# Patient Record
Sex: FEMALE | Race: WHITE | NOT HISPANIC OR LATINO | Employment: OTHER | URBAN - METROPOLITAN AREA
[De-identification: names, ages, dates, MRNs, and addresses within clinical notes are randomized per-mention and may not be internally consistent; named-entity substitution may affect disease eponyms.]

---

## 2017-02-24 ENCOUNTER — HOSPITAL ENCOUNTER (OUTPATIENT)
Dept: MAMMOGRAPHY | Facility: CLINIC | Age: 47
Discharge: HOME | End: 2017-02-24
Attending: FAMILY MEDICINE | Admitting: FAMILY MEDICINE
Payer: COMMERCIAL

## 2017-02-24 DIAGNOSIS — Z12.31 VISIT FOR SCREENING MAMMOGRAM: ICD-10-CM

## 2017-02-24 PROCEDURE — G0202 SCR MAMMO BI INCL CAD: HCPCS

## 2018-04-04 ENCOUNTER — OFFICE VISIT (OUTPATIENT)
Dept: OBGYN | Facility: CLINIC | Age: 48
End: 2018-04-04
Payer: COMMERCIAL

## 2018-04-04 VITALS
BODY MASS INDEX: 21.82 KG/M2 | DIASTOLIC BLOOD PRESSURE: 62 MMHG | HEIGHT: 68 IN | WEIGHT: 144 LBS | SYSTOLIC BLOOD PRESSURE: 112 MMHG

## 2018-04-04 DIAGNOSIS — N84.1 CERVICAL POLYP: ICD-10-CM

## 2018-04-04 DIAGNOSIS — Z01.419 ENCOUNTER FOR GYNECOLOGICAL EXAMINATION WITHOUT ABNORMAL FINDING: Primary | ICD-10-CM

## 2018-04-04 PROCEDURE — 87624 HPV HI-RISK TYP POOLED RSLT: CPT | Performed by: OBSTETRICS & GYNECOLOGY

## 2018-04-04 PROCEDURE — 88305 TISSUE EXAM BY PATHOLOGIST: CPT | Performed by: OBSTETRICS & GYNECOLOGY

## 2018-04-04 PROCEDURE — G0145 SCR C/V CYTO,THINLAYER,RESCR: HCPCS | Performed by: OBSTETRICS & GYNECOLOGY

## 2018-04-04 PROCEDURE — 99396 PREV VISIT EST AGE 40-64: CPT | Mod: 25 | Performed by: OBSTETRICS & GYNECOLOGY

## 2018-04-04 PROCEDURE — 57500 BIOPSY OF CERVIX: CPT | Performed by: OBSTETRICS & GYNECOLOGY

## 2018-04-04 NOTE — NURSING NOTE
"Chief Complaint   Patient presents with     Physical       Initial /62  Ht 5' 8\" (1.727 m)  Wt 144 lb (65.3 kg)  LMP 03/20/2018 (Approximate)  Breastfeeding? No  BMI 21.9 kg/m2 Estimated body mass index is 21.9 kg/(m^2) as calculated from the following:    Height as of this encounter: 5' 8\" (1.727 m).    Weight as of this encounter: 144 lb (65.3 kg).  Medication Reconciliation: complete     Gloria Mueller CMA      "

## 2018-04-04 NOTE — LETTER
April 11, 2018    Elinor Hammond  [address]  [place] [address]    Dear Elinor,  We are happy to inform you that your PAP smear result from 4/4/18 is normal.  We are now able to do a follow up test on PAP smears. The DNA test is for HPV (Human Papilloma Virus). Cervical cancer is closely linked with certain types of HPV. Your results showed no evidence of high risk HPV.  Therefore we recommend you return in 3 years for your next pap smear.  You will still need to return to the clinic every year for an annual exam and other preventive tests.  Please contact the clinic at [phone] with any questions.  Sincerely,    aPttie Hernandez MD/radha

## 2018-04-04 NOTE — PROGRESS NOTES
HPI: Elinor Hammond is a 47 year old female   Obstetric History       T1      L3     SAB0   TAB0   Ectopic0   Multiple1   Live Births0    Obstetric Comments   's    No LMP recorded. who presents for annual health maintenance.    Patient reports that she had no menses ×5-6 months, and then regular menses in February and March.  She states that her bleeding prior to this episode has been light, spotting.  She denies any episodes of prolonged or heavy periods.    Employment:homemaker, former teacher     Exercise/Eating:trying to eat health; she goes to the gym, jogs/walks approximately 3 times a week, 45-60 minutes each time    PGYNH: menarche age 9; + history of abnormal paps: one in early 20s, colpo/cryo, normal paps since, denies history of STDs    Past Medical History:   Diagnosis Date     NO ACTIVE PROBLEMS      Past Surgical History:   Procedure Laterality Date     HC RECONSTR NOSE+EMILY SEPTAL REPAIR       HC TRACHELORRHAPHY, PLASTIC REPAIR UTERINE CERVIX, VAG APPROACH      cerclage/      Family History   Problem Relation Age of Onset     CANCER Paternal Grandmother      lung     CANCER Paternal Grandfather      ?     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     Social History Main Topics     Smoking status: Never Smoker     Smokeless tobacco: Never Used     Alcohol use 0.5 oz/week     1 drink(s) per week      Comment: occ     Drug use: No     Sexual activity: Yes     Partners: Male     Birth control/ protection: Surgical      Comment: vas     Other Topics Concern      Service No     Blood Transfusions No     Caffeine Concern Yes     2 cups coffee     Weight Concern No     Special Diet No      Exercise Yes     cardio/classes     Seat Belt Yes     Self-Exams Yes     Social History Narrative       Review of Systems:   CONSTITUTIONAL:NEGATIVE for fever, chills, change in weight  INTEGUMENTARY/SKIN: NEGATIVE for worrisome rashes, moles or lesions  RESP:NEGATIVE for significant cough or SOB  BREAST: NEGATIVE for masses, tenderness or discharge  CV: NEGATIVE for chest pain, palpitations or peripheral edema  GI: NEGATIVE for abdominal pain, heartburn, or change in bowel habits, nausea, emesis  : negative for, dysuria, vaginal discharge and bleeding  MUSCULOSKELETAL: NEGATIVE for significant arthralgias or myalgia  NEURO: NEGATIVE for weakness, dizziness or paresthesias  PSYCHIATRIC: NEGATIVE for changes in mood or affect       Physical exam:  GENERAL APPEARANCE: healthy, alert and no distress  NECK: no adenopathy, no asymmetry, masses, or scars and thyroid normal to palpation  RESP: lungs clear to auscultation - no rales, rhonchi or wheezes  BREAST: normal without masses, tenderness or nipple discharge and no palpable axillary masses or adenopathy  CV: regular rates and rhythm, normal S1 S2, no S3 or S4 and no murmur, click or rub -  ABDOMEN:  soft, nontender, no HSM or masses and bowel sounds normal  PELVIC:   Vulva: normal external female genitalia,   Urethra meatus: normal, non-tender  Vagina: normal vaginal mucosa, rugated, no lesions, normal physiologic discharge.    Cervix: multiparous cervix,  with small approximately 4 mm endocervical polyp at the os.    This was grasped with a long handled clamp and easily removed with some gentle traction/torsion  Uterus: Normal contour, size, position; non-tender  Adnexa: No adnexal masses palpated, non-tender  Perineum: normal, no lesions, intact  Anus: normal, no lesions, hemorrhoids            Assessment/Plan:    (Z01.419) Encounter for gynecological examination without abnormal finding  (primary encounter diagnosis)  Comment:   Plan: Pap imaged thin layer screen  with HPV -         recommended age 30 - 65 years (select HPV order        below), HPV High Risk Types DNA Cervical            (N84.1) Cervical polyp  Comment:   Plan: Surgical pathology exam, BIOPSY/EXCIS CERVICAL         LESION W/WO FULGURATION              PE: reviewed health maintenance including diet, regular exercise and annual exams    This encounter was dictated using voice-recognition software; undetected errors may be present.      Pattie Hernandez M.D.

## 2018-04-04 NOTE — MR AVS SNAPSHOT
"              After Visit Summary   4/4/2018    Elinor Hammond    MRN: [id number]           Patient Information     Date Of Birth          1970        Visit Information        Provider Department      4/4/2018 9:30 AM Pattie Hernandez MD [facility]        Today's Diagnoses     Encounter for gynecological examination without abnormal finding    -  1    Cervical polyp           Follow-ups after your visit        Your next 10 appointments already scheduled     Apr 11, 2018 10:15 AM CDT   Physical-Complete with Gricelda Henriquez MD   [facility] ([facility])    [address]  [address]  [facility] [address] [phone]           Instruct patient that they should have nothing(except water) after midnight the evening prior to the appointment.              Who to contact     If you have questions or need follow up information about today's clinic visit or your schedule please contact [facility] directly at [phone].  Normal or non-critical lab and imaging results will be communicated to you by TourNativehart, letter or phone within 4 business days after the clinic has received the results. If you do not hear from us within 7 days, please contact the clinic through TourNativehart or phone. If you have a critical or abnormal lab result, we will notify you by phone as soon as possible.  Submit refill requests through [employer] or call your pharmacy and they will forward the refill request to us. Please allow 3 business days for your refill to be completed.          Additional Information About Your Visit        TourNativehart Information     [employer] lets you send messages to your doctor, view your test results, renew your prescriptions, schedule appointments and more. To sign up, go to [email or web address] . Click on \"Log in\" on the left side of the screen, which will take you to the Welcome page. Then click on \"Sign up Now\" on the right side " "of the page.     You will be asked to enter the access code listed below, as well as some personal information. Please follow the directions to create your username and password.     Your access code is: [email or web address]  Expires: 7/3/2018 11:01 AM     Your access code will  in 90 days. If you need help or a new code, please call your [place] clinic or [phone].        Care EveryWhere ID     This is your Care EveryWhere ID. This could be used by other organizations to access your [place] medical records  [id number]        Your Vitals Were     Height Last Period Breastfeeding? BMI (Body Mass Index)          5' 8\" (1.727 m) 2018 (Approximate) No 21.9 kg/m2         Blood Pressure from Last 3 Encounters:   18 112/62   04/06/15 104/66   03/05/15 120/80    Weight from Last 3 Encounters:   18 144 lb (65.3 kg)   04/06/15 138 lb 14.4 oz (63 kg)   03/23/15 140 lb (63.5 kg)              We Performed the Following     BIOPSY/EXCIS CERVICAL LESION W/WO FULGURATION     HPV High Risk Types DNA Cervical     Pap imaged thin layer screen with HPV - recommended age 30 - 65 years (select HPV order below)     Surgical pathology exam        Primary Care Provider Office Phone # Fax #    Gricelda Henriquez [phone]       [address] [address]        Equal Access to Services     [facility]: Hadii tenzin grimaldo hadasho Sochantaleali, waaxda luqadaha, qaybta kaalmada ambaryaliliane, reji matson . So [facility] [phone].    ATENCIÓN: Si habla español, tiene a chandler disposición servicios gratuitos de asistencia lingüística. Llame al [phone].    We comply with applicable federal civil rights laws and [place] laws. We do not discriminate on the basis of race, color, national origin, age, disability, sex, sexual orientation, or gender identity.            Thank you!     Thank you for choosing [facility]  for your care. Our goal is always to " provide you with excellent care. Hearing back from our patients is one way we can continue to improve our services. Please take a few minutes to complete the written survey that you may receive in the mail after your visit with us. Thank you!             Your Updated Medication List - Protect others around you: Learn how to safely use, store and throw away your medicines at www.disposemymeds.org.          This list is accurate as of 4/4/18 11:01 AM.  Always use your most recent med list.                   Brand Name Dispense Instructions for use Diagnosis    fish oil-omega-3 fatty acids 1000 MG capsule     180 capsule    Take 2 capsules by mouth daily.    Routine gynecological examination       MULTIVITAMIN TABS   OR           SUMAtriptan 25 MG tablet    IMITREX    18 tablet    Take 1-2 tablets (25-50 mg) by mouth at onset of headache for migraine May repeat dose in 2 hours.  Do not exceed 200 mg in 24 hours    Migraine, unspecified, without mention of intractable migraine without mention of status migrainosus       SYNTHROID 100 MCG tablet   Generic drug:  levothyroxine     90 tablet    Take 1 tablet by mouth daily.    Unspecified hypothyroidism, Goiter

## 2018-04-06 LAB
COPATH REPORT: NORMAL
COPATH REPORT: NORMAL
PAP: NORMAL

## 2018-04-10 LAB
FINAL DIAGNOSIS: NORMAL
HPV HR 12 DNA CVX QL NAA+PROBE: NEGATIVE
HPV16 DNA SPEC QL NAA+PROBE: NEGATIVE
HPV18 DNA SPEC QL NAA+PROBE: NEGATIVE
SPECIMEN DESCRIPTION: NORMAL
SPECIMEN SOURCE CVX/VAG CYTO: NORMAL

## 2018-04-11 ENCOUNTER — OFFICE VISIT (OUTPATIENT)
Dept: FAMILY MEDICINE | Facility: CLINIC | Age: 48
End: 2018-04-11

## 2018-04-11 VITALS
HEART RATE: 73 BPM | DIASTOLIC BLOOD PRESSURE: 66 MMHG | SYSTOLIC BLOOD PRESSURE: 110 MMHG | RESPIRATION RATE: 16 BRPM | WEIGHT: 144.8 LBS | HEIGHT: 68 IN | BODY MASS INDEX: 21.95 KG/M2 | TEMPERATURE: 97.4 F | OXYGEN SATURATION: 99 %

## 2018-04-11 DIAGNOSIS — Z01.419 ENCOUNTER FOR GYNECOLOGICAL EXAMINATION WITHOUT ABNORMAL FINDING: Primary | ICD-10-CM

## 2018-04-11 DIAGNOSIS — Z13.1 SCREENING FOR DIABETES MELLITUS: ICD-10-CM

## 2018-04-11 DIAGNOSIS — E03.8 OTHER SPECIFIED HYPOTHYROIDISM: ICD-10-CM

## 2018-04-11 DIAGNOSIS — Z13.21 ENCOUNTER FOR VITAMIN DEFICIENCY SCREENING: ICD-10-CM

## 2018-04-11 DIAGNOSIS — Z23 NEED FOR VACCINATION: ICD-10-CM

## 2018-04-11 DIAGNOSIS — Z13.220 SCREENING CHOLESTEROL LEVEL: ICD-10-CM

## 2018-04-11 PROBLEM — Z87.42 HX OF ABNORMAL CERVICAL PAP SMEAR: Status: RESOLVED | Noted: 2018-04-11 | Resolved: 2018-04-11

## 2018-04-11 LAB
% GRANULOCYTES: 57.9 %
HBA1C MFR BLD: 5.2 % (ref 4–7)
HCT VFR BLD AUTO: 38.3 % (ref 35–47)
HEMOGLOBIN: 13.2 G/DL (ref 11.7–15.7)
LYMPHOCYTES NFR BLD AUTO: 34 %
MCH RBC QN AUTO: 33.3 PG (ref 26–33)
MCHC RBC AUTO-ENTMCNC: 34.5 G/DL (ref 31–36)
MCV RBC AUTO: 96.6 FL (ref 78–100)
MONOCYTES NFR BLD AUTO: 8.1 %
PLATELET COUNT - QUEST: 189 10^9/L (ref 150–375)
RBC # BLD AUTO: 3.96 10*12/L (ref 3.8–5.2)
WBC # BLD AUTO: 5 10*9/L (ref 4–11)

## 2018-04-11 PROCEDURE — 36415 COLL VENOUS BLD VENIPUNCTURE: CPT | Performed by: FAMILY MEDICINE

## 2018-04-11 PROCEDURE — 85025 COMPLETE CBC W/AUTO DIFF WBC: CPT | Performed by: FAMILY MEDICINE

## 2018-04-11 PROCEDURE — 90471 IMMUNIZATION ADMIN: CPT | Performed by: FAMILY MEDICINE

## 2018-04-11 PROCEDURE — 82306 VITAMIN D 25 HYDROXY: CPT | Mod: 90 | Performed by: FAMILY MEDICINE

## 2018-04-11 PROCEDURE — 83036 HEMOGLOBIN GLYCOSYLATED A1C: CPT | Performed by: FAMILY MEDICINE

## 2018-04-11 PROCEDURE — 80061 LIPID PANEL: CPT | Mod: 90 | Performed by: FAMILY MEDICINE

## 2018-04-11 PROCEDURE — 90715 TDAP VACCINE 7 YRS/> IM: CPT | Performed by: FAMILY MEDICINE

## 2018-04-11 PROCEDURE — 99396 PREV VISIT EST AGE 40-64: CPT | Mod: 25 | Performed by: FAMILY MEDICINE

## 2018-04-11 NOTE — PATIENT INSTRUCTIONS
Await labs    Total calcium intake of 1500 mgm/day, vitamin D 400-800IU/day and a regular weight bearing exercise program for prevention of osteoporosis is recommended treatment at this time.    Sunscreen

## 2018-04-11 NOTE — PROGRESS NOTES
SUBJECTIVE:  Elinor Hammond is an 47 year old G 1 P 2 woman who presents for   A fasting physical exam.. Just saw GYN for a physical exam. Patient's last menstrual period was 2018 (approximate). Periods are irregular, lasting   3 days. Dysmenorrhea:none. Cyclic symptoms   include none. No intermenstrual bleeding,   spotting, or discharge.    Sees Dr Theodore for her thyroid/ still on 100 mcg daily    Current contraception: vasectomy  RAMÓN exposure: no  History of abnormal Pap smear: No  Family history of uterine or ovarian cancer: No  Regular self breast exam: Yes  History of abnormal mammogram: No  Family history of breast cancer: No  History of abnormal lipids: No    Past Medical History:   Diagnosis Date     NO ACTIVE PROBLEMS        Family History   Problem Relation Age of Onset     Neurologic Disorder Mother      migrain     Lipids Mother      DIABETES Mother      Hypertension Mother      CANCER Father      skin     Lipids Father      CANCER Paternal Grandmother      lung     CANCER Paternal Grandfather      ?       Past Surgical History:   Procedure Laterality Date     HC RECONSTR NOSE+EMILY SEPTAL REPAIR       HC TRACHELORRHAPHY, PLASTIC REPAIR UTERINE CERVIX, VAG APPROACH      cerclage/        Current Outpatient Prescriptions   Medication     SUMAtriptan (IMITREX) 25 MG tablet     SYNTHROID 100 MCG tablet     fish oil-omega-3 fatty acids 1000 MG capsule     MULTIVITAMIN TABS   OR     No current facility-administered medications for this visit.      Allergies   Allergen Reactions     Penicillins      rash     Sulfa Drugs      nausea and vomiting       Social History   Substance Use Topics     Smoking status: Never Smoker     Smokeless tobacco: Never Used     Alcohol use 0.5 oz/week     1 Standard drinks or equivalent per week      Comment: occ       Review Of Systems  Ears/Nose/Throat: negative  Respiratory: No shortness of breath, dyspnea on exertion, cough, or hemoptysis  Cardiovascular:  "negative  Gastrointestinal: negative  Genitourinary: negative  Endo: hypothyroid doing well/ last visit 9/2017    OBJECTIVE:  /66 (BP Location: Right arm, Patient Position: Chair, Cuff Size: Adult Regular)  Pulse 73  Temp 97.4  F (36.3  C) (Oral)  Ht 1.727 m (5' 8\")  Wt 65.7 kg (144 lb 12.8 oz)  LMP 03/20/2018 (Approximate)  SpO2 99%  Breastfeeding? No  BMI 22.02 kg/m2  General appearance: healthy, alert, no distress and cooperative  Skin: Skin color, texture, turgor normal. No rashes or lesions.  Ears: negative  Nose/Sinuses: Nares normal. Septum midline. Mucosa normal. No drainage or sinus tenderness.  Oropharynx: Lips, mucosa, and tongue normal. Teeth and gums normal.  Neck: Neck supple. No adenopathy. Thyroid symmetric, normal size,, Carotids without bruits.  Lungs: negative, Percussion normal. Good diaphragmatic excursion. Lungs clear  Heart: negative, PMI normal. No lifts, heaves, or thrills. RRR. No murmurs, clicks gallops or rub  Breasts: deferred  Abdomen: Abdomen soft, non-tender. BS normal. No masses, organomegaly  Pelvic: Deferred  BMI : Body mass index is 22.02 kg/(m^2).    ASSESSMENT:(Z01.419) Encounter for gynecological examination without abnormal finding  (primary encounter diagnosis)  Plan: VENIPUNC FNGR,HEEL,EAR [40508], AUTO         HEMOGRAM/PLATE/DIFF [56117.000]        Total calcium intake of 1500 mgm/day, vitamin D 400-800IU/day and a regular weight bearing exercise program for prevention of osteoporosis is recommended treatment at this time.      (E03.8) Other specified hypothyroidism  Plan: I have reviewed the patient's medical history in detail and updated the computerized patient record.      (Z13.1) Screening for diabetes mellitus  Plan: VENIPUNC FNGR,HEEL,EAR [69492], HEMOGLOBIN A1C            (Z13.220) Screening cholesterol level  Plan: VENIPUNC FNGR,HEEL,EAR [91433], LIPID PANEL            (Z13.21) Encounter for vitamin deficiency screening  Plan: VENIPUNC FNGR,HEEL,EAR " [57502], Vitamin D         deficiency screening (QUEST)            Tetanus given    Dx:  1)  Pap smear  2)  Mammography, lipids at appropriate intervals      PE:  Reviewed health maintenance including diet, regular exercise   and periodic exams.

## 2018-04-11 NOTE — MR AVS SNAPSHOT
After Visit Summary   4/11/2018    Elinor Hammond    MRN: [id number]           Patient Information     Date Of Birth          1970        Visit Information        Provider Department      4/11/2018 10:15 AM Gricelda Henriquez MD [facility]        Today's Diagnoses     Encounter for gynecological examination without abnormal finding    -  1    Other specified hypothyroidism        Screening for diabetes mellitus        Screening cholesterol level        Encounter for vitamin deficiency screening          Care Instructions    Await labs    Total calcium intake of 1500 mgm/day, vitamin D 400-800IU/day and a regular weight bearing exercise program for prevention of osteoporosis is recommended treatment at this time.    Sunscreen          Follow-ups after your visit        Follow-up notes from your care team     Return in about 1 year (around 4/11/2019), or if symptoms worsen or fail to improve.      Who to contact     If you have questions or need follow up information about today's clinic visit or your schedule please contact [facility] directly at [phone].  Normal or non-critical lab and imaging results will be communicated to you by Dnevnikhart, letter or phone within 4 business days after the clinic has received the results. If you do not hear from us within 7 days, please contact the clinic through [employer] or phone. If you have a critical or abnormal lab result, we will notify you by phone as soon as possible.  Submit refill requests through [employer] or call your pharmacy and they will forward the refill request to us. Please allow 3 business days for your refill to be completed.          Additional Information About Your Visit        Dnevnikhart Information     [employer] gives you secure access to your electronic health record. If you see a primary care provider, you can also send messages to your care team and make appointments. If you have questions,  "please call your primary care clinic.  If you do not have a primary care provider, please call [phone] and they will assist you.        Care EveryWhere ID     This is your Care EveryWhere ID. This could be used by other organizations to access your [place] medical records  [id number]        Your Vitals Were     Pulse Temperature Respirations Height Last Period Pulse Oximetry    73 97.4  F (36.3  C) (Oral) 16 1.727 m (5' 8\") 03/20/2018 (Approximate) 99%    Breastfeeding? BMI (Body Mass Index)                No 22.02 kg/m2           Blood Pressure from Last 3 Encounters:   04/11/18 110/66   04/04/18 112/62   04/06/15 104/66    Weight from Last 3 Encounters:   04/11/18 65.7 kg (144 lb 12.8 oz)   04/04/18 65.3 kg (144 lb)   04/06/15 63 kg (138 lb 14.4 oz)              We Performed the Following     AUTO HEMOGRAM/PLATE/DIFF [94532.000]     HEMOGLOBIN A1C     LIPID PANEL     VENIPUNC FNGR,HEEL,EAR [91101]     Vitamin D deficiency screening (QUEST)        Primary Care Provider Office Phone # Fax #    Gricelda Henriquez [phone]       [facility] [address]  [address] [place] [phone]        Equal Access to Services     [facility]: Hadii tenzin ku hadasho Soomaali, waaxda luqadaha, qaybta kaalmada adeegyada, waxay idiin hayaan ambar matson . So [facility] [phone].    ATENCIÓN: Si habla español, tiene a chandler disposición servicios gratuitos de asistencia lingüística. Llame al [phone].    We comply with applicable federal civil rights laws and [place] laws. We do not discriminate on the basis of race, color, national origin, age, disability, sex, sexual orientation, or gender identity.            Thank you!     Thank you for choosing [facility]  for your care. Our goal is always to provide you with excellent care. Hearing back from our patients is one way we can continue to improve our services. Please take a few minutes to complete the written survey that you may " receive in the mail after your visit with us. Thank you!             Your Updated Medication List - Protect others around you: Learn how to safely use, store and throw away your medicines at www.disposemymeds.org.          This list is accurate as of 4/11/18 11:06 AM.  Always use your most recent med list.                   Brand Name Dispense Instructions for use Diagnosis    fish oil-omega-3 fatty acids 1000 MG capsule     180 capsule    Take 2 capsules by mouth daily.    Routine gynecological examination       MULTIVITAMIN TABS   OR           SUMAtriptan 25 MG tablet    IMITREX    18 tablet    Take 1-2 tablets (25-50 mg) by mouth at onset of headache for migraine May repeat dose in 2 hours.  Do not exceed 200 mg in 24 hours    Migraine, unspecified, without mention of intractable migraine without mention of status migrainosus       SYNTHROID 100 MCG tablet   Generic drug:  levothyroxine     90 tablet    Take 1 tablet by mouth daily.    Unspecified hypothyroidism, Goiter

## 2018-04-11 NOTE — NURSING NOTE
Elinor is here for a CPX, pt is fasting and doesn't need a pap, had one last week with OBGYN    Patient is here for a full physical exam.    Pre-Visit Screening :  Immunizations : not up to date - tdap today  Colon Screening : na  Mammogram: is due to be scheduled  Asthma Action Test/Plan : na  PHQ9/GAD7 :  phq2      Vitals:  Pulse - regular  My Chart - accepts    CLASSIFICATION OF OVERWEIGHT AND OBESITY BY BMI                         Obesity Class           BMI(kg/m2)  Underweight                                    < 18.5  Normal                                         18.5-24.9  Overweight                                     25.0-29.9  OBESITY                     I                  30.0-34.9                              II                 35.0-39.9  EXTREME OBESITY             III                >40                             Patient's  BMI There is no height or weight on file to calculate BMI.  http://hin.nhlbi.nih.gov/menuplanner/menu.cgi  Questioned patient about current smoking habits.  Pt. has never smoked.    ETOH screening:  Questions:  1-How often do you have a drink containing alcohol?                             4 times per month(s)  2-How many drinks containing alcohol do you have on a typical day when you are         Drinking?                              1   3- How often do you have 5 or more drinks on one occasion?                              0 per year    Have you ever:  None of the patient's responses to the CAGE screening were positive / Negative CAGE score     The patient has verbalized that it is ok to leave a detailed voice message on the patient's home voicemail with results/recommendations from this visit.       Verified [phone]  phone number:

## 2018-04-12 LAB
CHOLEST SERPL-MCNC: 182 MG/DL
CHOLEST/HDLC SERPL: 4 (CALC)
HDLC SERPL-MCNC: 46 MG/DL
LDLC SERPL CALC-MCNC: 119 MG/DL (CALC)
NONHDLC SERPL-MCNC: 136 MG/DL (CALC)
TRIGL SERPL-MCNC: 71 MG/DL
VITAMIN D, 25-OH, TOTAL - QUEST: 48 NG/ML (ref 30–100)

## 2018-04-13 ENCOUNTER — MYC MEDICAL ADVICE (OUTPATIENT)
Dept: FAMILY MEDICINE | Facility: CLINIC | Age: 48
End: 2018-04-13

## 2018-04-13 NOTE — TELEPHONE ENCOUNTER
From: Elinor Hammond  To: Gricelda Henriquez MD  Sent: 4/13/2018 3:04 PM CDT  Subject: A follow-up question for a visit within the past seven days    Hi Dr. Henriquez,  RE: appt on 4-11-18  Do you known when I can expect to see the fasting glucose test results on [employer]? The cholesterol, vitamin D and hemoglobin results were posted today.   Thank you,  Elinor Hammond

## 2018-05-30 ENCOUNTER — HOSPITAL ENCOUNTER (OUTPATIENT)
Dept: MAMMOGRAPHY | Facility: CLINIC | Age: 48
Discharge: HOME | End: 2018-05-30
Attending: FAMILY MEDICINE | Admitting: FAMILY MEDICINE
Payer: COMMERCIAL

## 2018-05-30 DIAGNOSIS — Z12.31 VISIT FOR SCREENING MAMMOGRAM: ICD-10-CM

## 2018-05-30 PROCEDURE — 77063 BREAST TOMOSYNTHESIS BI: CPT

## 2018-06-04 ENCOUNTER — RADIANT APPOINTMENT (OUTPATIENT)
Dept: GENERAL RADIOLOGY | Facility: CLINIC | Age: 48
End: 2018-06-04
Attending: FAMILY MEDICINE
Payer: COMMERCIAL

## 2018-06-04 ENCOUNTER — OFFICE VISIT (OUTPATIENT)
Dept: ORTHOPEDICS | Facility: CLINIC | Age: 48
End: 2018-06-04
Payer: COMMERCIAL

## 2018-06-04 VITALS
BODY MASS INDEX: 21.82 KG/M2 | DIASTOLIC BLOOD PRESSURE: 62 MMHG | WEIGHT: 144 LBS | SYSTOLIC BLOOD PRESSURE: 102 MMHG | HEIGHT: 68 IN

## 2018-06-04 DIAGNOSIS — M79.671 PAIN OF BOTH HEELS: ICD-10-CM

## 2018-06-04 DIAGNOSIS — M79.672 PAIN OF BOTH HEELS: ICD-10-CM

## 2018-06-04 DIAGNOSIS — M72.2 PLANTAR FASCIITIS, BILATERAL: ICD-10-CM

## 2018-06-04 DIAGNOSIS — M25.579 PAIN IN JOINT INVOLVING ANKLE AND FOOT, UNSPECIFIED LATERALITY: Primary | ICD-10-CM

## 2018-06-04 DIAGNOSIS — M25.579 PAIN IN JOINT INVOLVING ANKLE AND FOOT, UNSPECIFIED LATERALITY: ICD-10-CM

## 2018-06-04 PROCEDURE — 73610 X-RAY EXAM OF ANKLE: CPT | Mod: LT

## 2018-06-04 PROCEDURE — 99203 OFFICE O/P NEW LOW 30 MIN: CPT | Performed by: FAMILY MEDICINE

## 2018-06-04 NOTE — PATIENT INSTRUCTIONS
1. Pain in joint involving ankle and foot, unspecified laterality    2. Pain of both heels    3. Plantar fasciitis, bilateral      Physical therapy: [facility] - [phone]  Discussed exam - tight achilles which contributes to pain over plantar/fascia and medial arch  Reviewed xray - mild midfoot arthritis (right), otherwise no other acute findings  Can consider Turmeric ([employer] - Omega Curcumin) OR Pure Encapsulations (Curcurmin Complex 3). Good anti-inflammatory.    Follow up if not improving after 6 therapy sessions.

## 2018-06-04 NOTE — LETTER
6/4/2018         RE: Elinor Hammond  [address]  [place] [address]        Dear Colleague,    Thank you for referring your patient, Elinor Hammond, to the [facility]. Please see a copy of my visit note below.    ASSESSMENT & PLAN    1. Pain in joint involving ankle and foot, unspecified laterality    2. Pain of both heels    3. Plantar fasciitis, bilateral      Discussed exam - tight achilles which contributes to pain over plantar/fascia and medial arch  Physical therapy: [place] [facility] - [phone]  Reviewed xray - mild midfoot arthritis (right), otherwise no other acute findings  Can consider Turmeric ([employer] Naturals - Omega Curcumin) OR Pure Encapsulations (Curcurmin Complex 3).     Follow up if not improving after 6 therapy sessions.     -----    SUBJECTIVE  Elinor Hammond is a/an 47 year old female who is seen as a self referral for evaluation of bilateral foot pain. The patient is seen by themselves.    Onset: 1 month(s) ago. Reports insidious onset without acute precipitating event.  Location of Pain: bilateral feet, pain goes into ankles/ Achilles,   Rating of Pain at worst: 8/10  Rating of Pain Currently: 8/10  Worsened by: walking, prolonged sitting, driving,   Better with: stretching (short term relief)  Treatments tried: rest/activity avoidance, ice, heat, ibuprofen, Aleve, home exercises and chiropractic care (2 visits), Magnesium, Epson salt soaks, massage  Associated symptoms: swelling, numbness (between 2-3 toes), tingling and crepitus  Orthopedic history: neck (PT no surgery)  Relevant surgical history: NO  Patient Social History: homemaker    Patient's past medical, surgical, social, and family histories were reviewed today and no changes are noted.    Exercise: 2-3 miles 4-5 days/week - OR elliptical.      REVIEW OF SYSTEMS:  10 point ROS is negative other than symptoms noted above in HPI, Past Medical History or as stated below  Constitutional:  "NEGATIVE for fever, chills, change in weight  Skin: NEGATIVE for worrisome rashes, moles or lesions  GI/: NEGATIVE for bowel or bladder changes  Neuro: NEGATIVE for weakness, dizziness or paresthesias    OBJECTIVE:  /62 (BP Location: Right arm, Patient Position: Chair, Cuff Size: Adult Regular)  Ht 5' 8\" (1.727 m)  Wt 144 lb (65.3 kg)  BMI 21.9 kg/m2   General: healthy, alert and in no distress  HEENT: no scleral icterus or conjunctival erythema  Skin: no suspicious lesions or rash. No jaundice.  CV:  no pedal edema  Resp: normal respiratory effort without conversational dyspnea   Psych: normal mood and affect  Gait: normal steady gait with appropriate coordination and balance  Neuro: Normal light sensory exam of lower extremity  MSK:  BILATERAL FOOT  Inspection:    no swelling or ecchymosis  Palpation:    Tender about the plantar fascia origin and medial arch and mildly over Achilles insertion. Otherwise remainder of bony/ligamentous landmarks are non-tender.  Range of Motion:     Grossly intact and non-painful  Strength:    Grossly intact in all planes  Special Tests:    Negative: heel strike, calcaneal squeeze and Lisfranc joint tenderness    BILATERAL ANKLE  Inspection:    No swelling or ecchymosis is observed  Range of Motion:     Plantarflexion full / dorsiflexion full / inversion full / eversion full  Strength:    full  Special Tests:    negative anterior drawer, negative talar tilt, negative valgus stress, negative forced external rotation/eversion, negative Garcias sign, negative squeeze test. Able to perform heel raise and Able to hop.    Independent visualization of the below image:  Recent Results (from the past 24 hour(s))   XR Ankle Bilateral G/E 3 Views    Narrative    ANKLE BILATERAL THREE OR MORE VIEWS  6/4/2018 11:25 AM     HISTORY:  Pain in joint involving ankle and foot, unspecified  laterality.    COMPARISON: None.      Impression    IMPRESSION:   1. Right ankle: No acute or " significant chronic bony or soft tissue  abnormality.  2. Left ankle: No acute or significant chronic bony abnormality.  Minimal lateral soft tissue swelling.     Patient's conditions were thoroughly discussed during today's visit with greater than 50% of the visit spent counseling the patient with total time spent face-to-face with the patient being 20 minutes.    DO [facility]        Again, thank you for allowing me to participate in the care of your patient.        Sincerely,        Niru Mckeon DO

## 2018-06-04 NOTE — PROGRESS NOTES
ASSESSMENT & PLAN    1. Pain in joint involving ankle and foot, unspecified laterality    2. Pain of both heels    3. Plantar fasciitis, bilateral      Discussed exam - tight achilles which contributes to pain over plantar/fascia and medial arch  Physical therapy: [place] [facility] - [phone]  Reviewed xray - mild midfoot arthritis (right), otherwise no other acute findings  Can consider Turmeric (N[employer] Naturals - Omega Curcumin) OR Pure Encapsulations (Curcurmin Complex 3).     Follow up if not improving after 6 therapy sessions.     -----    SUBJECTIVE  Elinor Hammond is a/an 47 year old female who is seen as a self referral for evaluation of bilateral foot pain. The patient is seen by themselves.    Onset: 1 month(s) ago. Reports insidious onset without acute precipitating event.  Location of Pain: bilateral feet, pain goes into ankles/ Achilles,   Rating of Pain at worst: 8/10  Rating of Pain Currently: 8/10  Worsened by: walking, prolonged sitting, driving,   Better with: stretching (short term relief)  Treatments tried: rest/activity avoidance, ice, heat, ibuprofen, Aleve, home exercises and chiropractic care (2 visits), Magnesium, Epson salt soaks, massage  Associated symptoms: swelling, numbness (between 2-3 toes), tingling and crepitus  Orthopedic history: neck (PT no surgery)  Relevant surgical history: NO  Patient Social History: homemaker    Patient's past medical, surgical, social, and family histories were reviewed today and no changes are noted.    Exercise: 2-3 miles 4-5 days/week - OR elliptical.      REVIEW OF SYSTEMS:  10 point ROS is negative other than symptoms noted above in HPI, Past Medical History or as stated below  Constitutional: NEGATIVE for fever, chills, change in weight  Skin: NEGATIVE for worrisome rashes, moles or lesions  GI/: NEGATIVE for bowel or bladder changes  Neuro: NEGATIVE for weakness, dizziness or paresthesias    OBJECTIVE:  /62 (BP Location:  "Right arm, Patient Position: Chair, Cuff Size: Adult Regular)  Ht 5' 8\" (1.727 m)  Wt 144 lb (65.3 kg)  BMI 21.9 kg/m2   General: healthy, alert and in no distress  HEENT: no scleral icterus or conjunctival erythema  Skin: no suspicious lesions or rash. No jaundice.  CV:  no pedal edema  Resp: normal respiratory effort without conversational dyspnea   Psych: normal mood and affect  Gait: normal steady gait with appropriate coordination and balance  Neuro: Normal light sensory exam of lower extremity  MSK:  BILATERAL FOOT  Inspection:    no swelling or ecchymosis  Palpation:    Tender about the plantar fascia origin and medial arch and mildly over Achilles insertion. Otherwise remainder of bony/ligamentous landmarks are non-tender.  Range of Motion:     Grossly intact and non-painful  Strength:    Grossly intact in all planes  Special Tests:    Negative: heel strike, calcaneal squeeze and Lisfranc joint tenderness    BILATERAL ANKLE  Inspection:    No swelling or ecchymosis is observed  Range of Motion:     Plantarflexion full / dorsiflexion full / inversion full / eversion full  Strength:    full  Special Tests:    negative anterior drawer, negative talar tilt, negative valgus stress, negative forced external rotation/eversion, negative Garcias sign, negative squeeze test. Able to perform heel raise and Able to hop.    Independent visualization of the below image:  Recent Results (from the past 24 hour(s))   XR Ankle Bilateral G/E 3 Views    Narrative    ANKLE BILATERAL THREE OR MORE VIEWS  6/4/2018 11:25 AM     HISTORY:  Pain in joint involving ankle and foot, unspecified  laterality.    COMPARISON: None.      Impression    IMPRESSION:   1. Right ankle: No acute or significant chronic bony or soft tissue  abnormality.  2. Left ankle: No acute or significant chronic bony abnormality.  Minimal lateral soft tissue swelling.     Patient's conditions were thoroughly discussed during today's visit with greater than " 50% of the visit spent counseling the patient with total time spent face-to-face with the patient being 20 minutes.    Niru Mckeon DO [facility]

## 2018-06-04 NOTE — MR AVS SNAPSHOT
After Visit Summary   6/4/2018    Elinor Hammond    MRN: [id number]           Patient Information     Date Of Birth          1970        Visit Information        Provider Department      6/4/2018 10:40 AM Niru Mckeon, DO [facility]        Today's Diagnoses     Pain in joint involving ankle and foot, unspecified laterality    -  1    Pain of both heels        Plantar fasciitis, bilateral          Care Instructions    1. Pain in joint involving ankle and foot, unspecified laterality    2. Pain of both heels    3. Plantar fasciitis, bilateral      Physical therapy: [place] [facility] - [phone]  Discussed exam - tight achilles which contributes to pain over plantar/fascia and medial arch  Reviewed xray - mild midfoot arthritis (right), otherwise no other acute findings  Can consider Turmeric (N[employer] Naturals - Omega Curcumin) OR Pure Encapsulations (Curcurmin Complex 3). Good anti-inflammatory.    Follow up if not improving after 6 therapy sessions.           Follow-ups after your visit        Additional Services     [facility] PT, HAND, AND CHIROPRACTIC REFERRAL       **This order will print in the [facility] Scheduling Office**    Physical Therapy, Hand Therapy and Chiropractic Care are available through:    *[facility] [place] [facility]  *[facility]  *[facility]    Call one number to schedule at any of the above locations: [phone].    Your provider has referred you to: Physical Therapy at [facility] or [facility]    Indication/Reason for Referral: bilateral plantar foot pain, address achilles (gastroc/soleous complex) and intrinsic foot muscles  Onset of Illness: see chart  Therapy Orders: Evaluate and Treat  Special Programs: None  Special Request: None    Mariposa Monge      Additional Comments for the Therapist or Chiropractor: Formal physical therapy - stretching of the plantar fascia, achilles tendon, gastrocnemius/soleus, and  flexor hallucis longus with use of arch taping/modalities (iontophoresis, extracorporeal shock wave therapy, etc.) as deemed appropriate with home exercise prescription.    Formal physical therapy - eccentric exercise regimen including calf, achilles, and hamstring stretching/strengthening along with core strengthening including use of taping/modalities (ultrasound, extracorporeal shock wave therapy, low-level laser therapy, and iontophoresis) as deemed appropriate with home exercise prescription.      Please be aware that coverage of these services is subject to the terms and limitations of your health insurance plan.  Call member services at your health plan with any benefit or coverage questions.      Please bring the following to your appointment:    *Your personal calendar for scheduling future appointments  *Comfortable clothing                  Who to contact     If you have questions or need follow up information about today's clinic visit or your schedule please contact [facility]IC[facility] directly at [phone].  Normal or non-critical lab and imaging results will be communicated to you by MyChart, letter or phone within 4 business days after the clinic has received the results. If you do not hear from us within 7 days, please contact the clinic through [employer] or phone. If you have a critical or abnormal lab result, we will notify you by phone as soon as possible.  Submit refill requests through [employer] or call your pharmacy and they will forward the refill request to us. Please allow 3 business days for your refill to be completed.          Additional Information About Your Visit        Inporiahart Information     [employer] gives you secure access to your electronic health record. If you see a primary care provider, you can also send messages to your care team and make appointments. If you have questions, please call your primary care clinic.  If you do not have a primary care provider, please  "call [phone] and they will assist you.        Care EveryWhere ID     This is your Care EveryWhere ID. This could be used by other organizations to access your [place] medical records  [id number]        Your Vitals Were     Height BMI (Body Mass Index)                5' 8\" (1.727 m) 21.9 kg/m2           Blood Pressure from Last 3 Encounters:   06/04/18 102/62   04/11/18 110/66   04/04/18 112/62    Weight from Last 3 Encounters:   06/04/18 144 lb (65.3 kg)   04/11/18 144 lb 12.8 oz (65.7 kg)   04/04/18 144 lb (65.3 kg)              We Performed the Following     [facility] PT, HAND, AND CHIROPRACTIC REFERRAL        Primary Care Provider Office Phone # Fax #    Gricelda Henriquez [phone] [address] [address]        Equal Access to Services     [facility]: Hadii aad ku hadasho Soomaali, waaxda luqadaha, qaybta kaalmada adeegyada, waxay idiin hayaan kathieg finesse matson . So [facility] [phone].    ATENCIÓN: Si habla español, tiene a chandler disposición servicios gratuitos de asistencia lingüística. Llame al [phone].    We comply with applicable federal civil rights laws and [place] laws. We do not discriminate on the basis of race, color, national origin, age, disability, sex, sexual orientation, or gender identity.            Thank you!     Thank you for choosing [facility]  for your care. Our goal is always to provide you with excellent care. Hearing back from our patients is one way we can continue to improve our services. Please take a few minutes to complete the written survey that you may receive in the mail after your visit with us. Thank you!             Your Updated Medication List - Protect others around you: Learn how to safely use, store and throw away your medicines at www.disposemymeds.org.          This list is accurate as of 6/4/18 11:44 AM.  Always use your most recent med list.                   Brand Name Dispense " Instructions for use Diagnosis    fish oil-omega-3 fatty acids 1000 MG capsule     180 capsule    Take 2 capsules by mouth daily.    Routine gynecological examination       magnesium citrate solution      Take 296 mLs by mouth once        MULTIVITAMIN TABS   OR           SUMAtriptan 25 MG tablet    IMITREX    18 tablet    Take 1-2 tablets (25-50 mg) by mouth at onset of headache for migraine May repeat dose in 2 hours.  Do not exceed 200 mg in 24 hours    Migraine, unspecified, without mention of intractable migraine without mention of status migrainosus       SYNTHROID 100 MCG tablet   Generic drug:  levothyroxine     90 tablet    Take 1 tablet by mouth daily.    Unspecified hypothyroidism, Goiter

## 2018-06-05 ENCOUNTER — THERAPY VISIT (OUTPATIENT)
Dept: PHYSICAL THERAPY | Facility: CLINIC | Age: 48
End: 2018-06-05
Payer: COMMERCIAL

## 2018-06-05 DIAGNOSIS — M79.671 BILATERAL LEG AND FOOT PAIN: Primary | ICD-10-CM

## 2018-06-05 DIAGNOSIS — M79.672 BILATERAL LEG AND FOOT PAIN: Primary | ICD-10-CM

## 2018-06-05 DIAGNOSIS — M79.604 BILATERAL LEG AND FOOT PAIN: Primary | ICD-10-CM

## 2018-06-05 DIAGNOSIS — M79.605 BILATERAL LEG AND FOOT PAIN: Primary | ICD-10-CM

## 2018-06-05 PROCEDURE — 97110 THERAPEUTIC EXERCISES: CPT | Mod: GP | Performed by: PHYSICAL THERAPIST

## 2018-06-05 PROCEDURE — 97161 PT EVAL LOW COMPLEX 20 MIN: CPT | Mod: GP | Performed by: PHYSICAL THERAPIST

## 2018-06-05 NOTE — PROGRESS NOTES
[place] [facility] Initial Evaluation  Elinor Hammond is a 47 year old  female referred to physical therapy by  *** for treatment of *** with Precautions/Restrictions/MD instructions ***     Physical Therapy Initial Evaluation: Subjective History      Injury/Condition Details:  Presenting Complaint ***   Onset Timing/Date ***   Mechanism ***      Symptom Behavior Details    Primary Pain Symptoms Location: ***  Quality: ***   Frequency: ***   Worst Pain ***/10   Best Pain ***/10   Symptom Provocators ***   Symptom Relievers ***   Time of day dependent? ***   Recent symptom change? ***      Prior Testing/Intervention for current condition:  Prior Tests ***   Prior Treatment ***      Lifestyle & General Medical History:  General Health Reported by Patient ***   Employment ***   Usual physical activities  (within past year) ***   Orthopaedic history ***   Notable medical history ***       HPI                    Objective:  System    Physical Exam    General     ROS    Assessment/Plan:    {REHAB NOTES:634244}

## 2018-06-05 NOTE — MR AVS SNAPSHOT
After Visit Summary   6/5/2018    Elinor Hammond    MRN: [id number]           Patient Information     Date Of Birth          1970        Visit Information        Provider Department      6/5/2018 10:20 AM Rodney Yung PT [facility]        Today's Diagnoses     Bilateral leg and foot pain    -  1       Follow-ups after your visit        Your next 10 appointments already scheduled     Jun 13, 2018  8:50 AM CDT   [facility] Extremity with Russ Alvarado PT   [facility] ([facility] [place])    [address]  [place] [id number] [phone]            Jun 21, 2018  9:40 AM CDT   [facility] Extremity with Rodney Yung PT   [facility] ([facility] [place])    [address] [place] [id number] [phone]              Who to contact     If you have questions or need follow up information about today's clinic visit or your schedule please contact [place] [facility] directly at [phone].  Normal or non-critical lab and imaging results will be communicated to you by Nopsechart, letter or phone within 4 business days after the clinic has received the results. If you do not hear from us within 7 days, please contact the clinic through [employer] or phone. If you have a critical or abnormal lab result, we will notify you by phone as soon as possible.  Submit refill requests through [employer] or call your pharmacy and they will forward the refill request to us. Please allow 3 business days for your refill to be completed.          Additional Information About Your Visit        Nopsechart Information     [employer] gives you secure access to your electronic health record. If you see a primary care provider, you can also send messages to your care team and make appointments. If you have questions, please call your primary care clinic.  If you do not have a primary care provider, please call [phone] and they will assist  you.        Care EveryWhere ID     This is your Care EveryWhere ID. This could be used by other organizations to access your [place] medical records  [id number]         Blood Pressure from Last 3 Encounters:   06/04/18 102/62   04/11/18 110/66   04/04/18 112/62    Weight from Last 3 Encounters:   06/04/18 65.3 kg (144 lb)   04/11/18 65.7 kg (144 lb 12.8 oz)   04/04/18 65.3 kg (144 lb)              We Performed the Following     [facility] Inital Eval Report     PT Eval, Low Complexity (87523)     Therapeutic Exercises        Primary Care Provider Office Phone # Fax #    Gricelda eHnriquez [phone] [address]  [address] [place] [address]        Equal Access to Services     [facility]: Hadii aad dillan hadasho Soomaali, waaxda luqadaha, qaybta kaalmada adeegyada, reji matson . So [facility] [phone].    ATENCIÓN: Si habla español, tiene a chandler disposición servicios gratuitos de asistencia lingüística. [place] al [phone].    We comply with applicable federal civil rights laws and [place] laws. We do not discriminate on the basis of race, color, national origin, age, disability, sex, sexual orientation, or gender identity.            Thank you!     Thank you for choosing [place] [facility]  for your care. Our goal is always to provide you with excellent care. Hearing back from our patients is one way we can continue to improve our services. Please take a few minutes to complete the written survey that you may receive in the mail after your visit with us. Thank you!             Your Updated Medication List - Protect others around you: Learn how to safely use, store and throw away your medicines at www.disposemymeds.org.          This list is accurate as of 6/5/18 11:38 AM.  Always use your most recent med list.                   Brand Name Dispense Instructions for use Diagnosis    fish oil-omega-3 fatty acids 1000 MG capsule     180 capsule     Take 2 capsules by mouth daily.    Routine gynecological examination       magnesium citrate solution      Take 296 mLs by mouth once        MULTIVITAMIN TABS   OR           SUMAtriptan 25 MG tablet    IMITREX    18 tablet    Take 1-2 tablets (25-50 mg) by mouth at onset of headache for migraine May repeat dose in 2 hours.  Do not exceed 200 mg in 24 hours    Migraine, unspecified, without mention of intractable migraine without mention of status migrainosus       SYNTHROID 100 MCG tablet   Generic drug:  levothyroxine     90 tablet    Take 1 tablet by mouth daily.    Unspecified hypothyroidism, Goiter

## 2018-06-05 NOTE — PROGRESS NOTES
"[facility] Initial Evaluation  Elinor Hammond is a 47 year old  female referred to physical therapy by Dr. Mckeon for treatment of Bilateral foot and heel pain with Precautions/Restrictions/MD instructions Evaluate and Treat, address gastroc/soleus complex and foot intrinsic musculature.     Physical Therapy Initial Evaluation: Subjective History      Injury/Condition Details:  Presenting Complaint B heel/foot pain   Onset Timing/Date 6 weeks ago   Mechanism Insidious       Symptom Behavior Details    Primary Pain Symptoms Location: heel, arch, achilles, top of foot  Quality: dull achy uncomfortable, \"heavy\"   Frequency: constant   Worst Pain 6/10   Best Pain 0/10   Symptom Provocators Prolonged sitting, standing, walking, first steps in morning, driving   Symptom Relievers Stretching, shoe wear, epsom salt   Time of day dependent? Middle of the night/early morning. During the day if very active/walking   Recent symptom change? Started with L foot now moved to R also after a few weeks      Prior Testing/Intervention for current condition:  Prior Tests X-ray indicating navicular bone spur   Prior Treatment No PT; started self stretch and modified shoe wear      Lifestyle & General Medical History:  General Health Reported by Patient Good   Employment None   Usual physical activities  (within past year) Walking, activities at gym, some running, running errands   Orthopaedic history None   Notable medical history Thyroid problems, pain at night/rest       HPI                    Objective:  System    Ankle/Foot Evaluation  ROM:    AROM:    Dorsiflexion:  Left:   7  Right:   13  Plantarflexion:  Left:  70    Right:  58            Strength:    Dorsiflexion:  Left: 5/5     Pain:   Right: 5/5   Pain:  Plantarflexion: Left: 5/5   Pain:   Right: 5/5  Pain:  Inversion:Left: 5/5  Pain:     Right: 5/5  Pain:  Eversion:Left: 5/5  Pain:  Right: 5/5  Pain:                      PALPATION:   Left ankle tenderness " present at:  plantar fascia  Right ankle tenderness present at:   gastroc/soleus and anterior tibialis                                                        General     ROS    Assessment/Plan:    Patient is a 47 year old female with both sides ankle complaints.    Patient has the following significant findings with corresponding treatment plan.                Diagnosis 1:  B heel/foot pain  Pain -  manual therapy, splint/taping/bracing/orthotics, self management, education and home program  Decreased ROM/flexibility - manual therapy, therapeutic exercise, therapeutic activity and home program  Decreased joint mobility - manual therapy, therapeutic exercise, therapeutic activity and home program  Decreased strength - therapeutic exercise, therapeutic activities and home program  Impaired balance - neuro re-education, therapeutic activities and home program  Decreased proprioception - neuro re-education, therapeutic activities and home program  Impaired gait - gait training and home program  Impaired muscle performance - neuro re-education and home program  Decreased function - therapeutic activities and home program    Therapy Evaluation Codes:   1) History comprised of:   Personal factors that impact the plan of care:      None.    Comorbidity factors that impact the plan of care are:      None.     Medications impacting care: None.  2) Examination of Body Systems comprised of:   Body structures and functions that impact the plan of care:      Ankle.   Activity limitations that impact the plan of care are:      Cooking, Driving, Jumping, Running, Sitting, Stairs, Standing, Walking, Sleeping and Laying down.  3) Clinical presentation characteristics are:   Stable/Uncomplicated.  4) Decision-Making    Low complexity using standardized patient assessment instrument and/or measureable assessment of functional outcome.  Cumulative Therapy Evaluation is: Low complexity.    Previous and current functional limitations:   (See Goal Flow Sheet for this information)    Short term and Long term goals: (See Goal Flow Sheet for this information)     Communication ability:  Patient appears to be able to clearly communicate and understand verbal and written communication and follow directions correctly.  Treatment Explanation - The following has been discussed with the patient:   RX ordered/plan of care  Anticipated outcomes  Possible risks and side effects  This patient would benefit from PT intervention to resume normal activities.   Rehab potential is excellent.    Frequency:  1 X week, once daily  Duration:  for 6 weeks  Discharge Plan:  Achieve all LTG.  Independent in home treatment program.  Reach maximal therapeutic benefit.    Please refer to the daily flowsheet for treatment today, total treatment time and time spent performing 1:1 timed codes.

## 2018-06-13 ENCOUNTER — THERAPY VISIT (OUTPATIENT)
Dept: PHYSICAL THERAPY | Facility: CLINIC | Age: 48
End: 2018-06-13
Payer: COMMERCIAL

## 2018-06-13 DIAGNOSIS — M79.672 BILATERAL LEG AND FOOT PAIN: Primary | ICD-10-CM

## 2018-06-13 DIAGNOSIS — M79.605 BILATERAL LEG AND FOOT PAIN: Primary | ICD-10-CM

## 2018-06-13 DIAGNOSIS — M79.671 BILATERAL LEG AND FOOT PAIN: Primary | ICD-10-CM

## 2018-06-13 DIAGNOSIS — M79.604 BILATERAL LEG AND FOOT PAIN: Primary | ICD-10-CM

## 2018-06-13 PROCEDURE — 97110 THERAPEUTIC EXERCISES: CPT | Mod: GP | Performed by: PHYSICAL THERAPIST

## 2018-06-13 PROCEDURE — 97530 THERAPEUTIC ACTIVITIES: CPT | Mod: GP | Performed by: PHYSICAL THERAPIST

## 2018-06-21 ENCOUNTER — THERAPY VISIT (OUTPATIENT)
Dept: PHYSICAL THERAPY | Facility: CLINIC | Age: 48
End: 2018-06-21
Payer: COMMERCIAL

## 2018-06-21 DIAGNOSIS — M79.604 BILATERAL LEG AND FOOT PAIN: ICD-10-CM

## 2018-06-21 DIAGNOSIS — M79.605 BILATERAL LEG AND FOOT PAIN: ICD-10-CM

## 2018-06-21 DIAGNOSIS — M79.671 BILATERAL LEG AND FOOT PAIN: ICD-10-CM

## 2018-06-21 DIAGNOSIS — M79.672 BILATERAL LEG AND FOOT PAIN: ICD-10-CM

## 2018-06-21 PROCEDURE — 97140 MANUAL THERAPY 1/> REGIONS: CPT | Mod: GP | Performed by: PHYSICAL THERAPIST

## 2018-06-21 PROCEDURE — 97110 THERAPEUTIC EXERCISES: CPT | Mod: GP | Performed by: PHYSICAL THERAPIST

## 2018-07-10 ENCOUNTER — THERAPY VISIT (OUTPATIENT)
Dept: PHYSICAL THERAPY | Facility: CLINIC | Age: 48
End: 2018-07-10
Payer: COMMERCIAL

## 2018-07-10 DIAGNOSIS — M79.672 BILATERAL LEG AND FOOT PAIN: ICD-10-CM

## 2018-07-10 DIAGNOSIS — M79.605 BILATERAL LEG AND FOOT PAIN: ICD-10-CM

## 2018-07-10 DIAGNOSIS — M79.604 BILATERAL LEG AND FOOT PAIN: ICD-10-CM

## 2018-07-10 DIAGNOSIS — M79.671 BILATERAL LEG AND FOOT PAIN: ICD-10-CM

## 2018-07-10 PROCEDURE — 97112 NEUROMUSCULAR REEDUCATION: CPT | Mod: GP | Performed by: PHYSICAL THERAPIST

## 2018-07-10 PROCEDURE — 97110 THERAPEUTIC EXERCISES: CPT | Mod: GP | Performed by: PHYSICAL THERAPIST

## 2018-09-04 ENCOUNTER — THERAPY VISIT (OUTPATIENT)
Dept: PHYSICAL THERAPY | Facility: CLINIC | Age: 48
End: 2018-09-04
Payer: COMMERCIAL

## 2018-09-04 DIAGNOSIS — M79.671 BILATERAL LEG AND FOOT PAIN: ICD-10-CM

## 2018-09-04 DIAGNOSIS — M79.605 BILATERAL LEG AND FOOT PAIN: ICD-10-CM

## 2018-09-04 DIAGNOSIS — M79.672 BILATERAL LEG AND FOOT PAIN: ICD-10-CM

## 2018-09-04 DIAGNOSIS — M79.604 BILATERAL LEG AND FOOT PAIN: ICD-10-CM

## 2018-09-04 PROCEDURE — 97112 NEUROMUSCULAR REEDUCATION: CPT | Mod: GP | Performed by: PHYSICAL THERAPIST

## 2018-09-04 PROCEDURE — 97110 THERAPEUTIC EXERCISES: CPT | Mod: GP | Performed by: PHYSICAL THERAPIST

## 2018-10-10 ENCOUNTER — MYC MEDICAL ADVICE (OUTPATIENT)
Dept: FAMILY MEDICINE | Facility: CLINIC | Age: 48
End: 2018-10-10

## 2018-10-10 DIAGNOSIS — Z12.11 SPECIAL SCREENING FOR MALIGNANT NEOPLASMS, COLON: Primary | ICD-10-CM

## 2018-10-10 NOTE — TELEPHONE ENCOUNTER
Patient sent [employer] requesting a referral for a Colonoscopy. I have ordered the Colonoscopy Referral and sent the patient a [employer] message with all of the information for Lupe Nelson,.    Nilda Bolivar, [facility]

## 2019-11-12 ENCOUNTER — HOSPITAL ENCOUNTER (OUTPATIENT)
Dept: MAMMOGRAPHY | Facility: CLINIC | Age: 49
Discharge: HOME | End: 2019-11-12
Attending: FAMILY MEDICINE | Admitting: FAMILY MEDICINE
Payer: COMMERCIAL

## 2019-11-12 DIAGNOSIS — Z12.31 VISIT FOR SCREENING MAMMOGRAM: ICD-10-CM

## 2019-11-12 PROCEDURE — 77063 BREAST TOMOSYNTHESIS BI: CPT

## 2020-02-10 ENCOUNTER — HEALTH MAINTENANCE LETTER (OUTPATIENT)
Age: 50
End: 2020-02-10

## 2020-11-10 ENCOUNTER — OFFICE VISIT (OUTPATIENT)
Dept: OBGYN | Facility: CLINIC | Age: 50
End: 2020-11-10
Payer: COMMERCIAL

## 2020-11-10 VITALS
WEIGHT: 145 LBS | BODY MASS INDEX: 21.98 KG/M2 | DIASTOLIC BLOOD PRESSURE: 68 MMHG | HEIGHT: 68 IN | SYSTOLIC BLOOD PRESSURE: 124 MMHG

## 2020-11-10 DIAGNOSIS — Z12.11 SCREEN FOR COLON CANCER: ICD-10-CM

## 2020-11-10 DIAGNOSIS — N90.89 VULVAR SKIN TAG: Primary | ICD-10-CM

## 2020-11-10 DIAGNOSIS — Z12.4 SCREENING FOR CERVICAL CANCER: ICD-10-CM

## 2020-11-10 PROCEDURE — G0145 SCR C/V CYTO,THINLAYER,RESCR: HCPCS | Performed by: OBSTETRICS & GYNECOLOGY

## 2020-11-10 PROCEDURE — 99214 OFFICE O/P EST MOD 30 MIN: CPT | Performed by: OBSTETRICS & GYNECOLOGY

## 2020-11-10 PROCEDURE — 87624 HPV HI-RISK TYP POOLED RSLT: CPT | Performed by: OBSTETRICS & GYNECOLOGY

## 2020-11-10 ASSESSMENT — MIFFLIN-ST. JEOR: SCORE: 1316.22

## 2020-11-10 NOTE — PROGRESS NOTES
"Resent fit test, apparently lost in mail  Bonita Wade CMA    SUBJECTIVE:                                                   CC:  Patient presents with:  Vaginal Problem: possible cyst      HPI:  Elinor Hammond is a 50 year old  who presents for evaluation of a possible cyst.      She has a history of cervical polyps and had one removed two years ago with Dr Hernandez during her annual exam.  She presents today with similar symptoms to last time.  Wonders if she also needs an annual exam.      She has a history of normal pap smears but strongly desires repeat pap smear today for peace of mind.      ROS: 10 point ROS negative other than as listed above in HPI.    Gyn History:  No LMP recorded.        Last 3 Pap and HPV Results:   PAP / HPV Latest Ref Rng & Units 2018 6/10/2011 2009   PAP - NIL NIL NIL   HPV 16 DNA NEG:Negative Negative - -   HPV 18 DNA NEG:Negative Negative - -   OTHER HR HPV NEG:Negative Negative - -       PMH, PSH, Soc Hx, Fam Hx, Meds, and allergies reviewed in Epic.  Denies family history of gyn cancer    OBJECTIVE:     /68 (BP Location: Right arm, Patient Position: Chair, Cuff Size: Adult Regular)   Ht 1.727 m (5' 8\")   Wt 65.8 kg (145 lb)   Breastfeeding No   BMI 22.05 kg/m      Gen: Healthy appearing thin female, no acute distress, comfortable  HENT: No scleral injection or icterus  CV: Regular rate  Resp: Normal work of breathing, no cough  GI: Abdomen soft, non-tender. No masses, organomegaly  Skin: No suspicious lesions or rashes  Psychiatric: mentation appears normal and affect bright    : Normal external female genitalia.  Very tiny skin tag on the right labia minora, somewhat papillary in appearance, non erythematous, non indurated, very slightly TTP.  SSE: Speculum exam reveals vaginal epithelium atrophic with normal physiologic discharge. Cervix appears smooth, pink, with no visible lesions.  No evidence of polyp.  +friable cervix which oozed slightly " after pap smear  Bimanual exam reveals normal size uterus, non-tender, and mobile. No adnexal masses or tenderness. No cervical motion tenderness.     ASSESSMENT/PLAN:                                                      1. Vulvar skin tag  Reviewed options for mgmt which include expectant mgmt or excision in the clinic with lidocaine.  At this time she wants to manage expectantly.    2. Screen for colon cancer  Hasn't yet done colon cancer screening, desires FIT testing.   - Fecal colorectal cancer screen (FIT); Future    3. Screening for cervical cancer  Discussed that she does not need cervical cancer screening more frequently than every 5 years if her last paps have been normal, however she desires repeat pap testing today for peace of mind.   - Pap imaged thin layer screen with HPV - recommended age 30 - 65  - HPV High Risk Types DNA Cervical      Sultana Cross MD, MPH  Obstetrics and Gynecology

## 2020-11-10 NOTE — NURSING NOTE
"Chief Complaint   Patient presents with     Vaginal Problem     possible cyst       Initial /68 (BP Location: Right arm, Patient Position: Chair, Cuff Size: Adult Regular)   Ht 1.727 m (5' 8\")   Wt 65.8 kg (145 lb)   Breastfeeding No   BMI 22.05 kg/m   Estimated body mass index is 22.05 kg/m  as calculated from the following:    Height as of this encounter: 1.727 m (5' 8\").    Weight as of this encounter: 65.8 kg (145 lb).  BP completed using cuff size: regular    Questioned patient about current smoking habits.  Pt. has never smoked.          The following HM Due: NONE    Bonita Wade CMA    "

## 2020-11-13 LAB
COPATH REPORT: NORMAL
PAP: NORMAL

## 2020-11-16 ENCOUNTER — HEALTH MAINTENANCE LETTER (OUTPATIENT)
Age: 50
End: 2020-11-16

## 2020-12-09 ENCOUNTER — HOSPITAL ENCOUNTER (OUTPATIENT)
Dept: NUCLEAR MEDICINE | Facility: CLINIC | Age: 50
Setting detail: NUCLEAR MEDICINE
End: 2020-12-09
Attending: SPECIALIST
Payer: COMMERCIAL

## 2020-12-09 DIAGNOSIS — E05.90 HYPERTHYROIDISM: ICD-10-CM

## 2020-12-09 PROCEDURE — A9500 TC99M SESTAMIBI: HCPCS | Performed by: RADIOLOGY

## 2020-12-09 PROCEDURE — 78072 PARATHYRD PLANAR W/SPECT&CT: CPT

## 2020-12-09 PROCEDURE — 343N000001 HC RX 343: Performed by: RADIOLOGY

## 2020-12-09 RX ADMIN — Medication 28.8 MILLICURIE: at 09:15

## 2021-01-05 ENCOUNTER — MYC MEDICAL ADVICE (OUTPATIENT)
Dept: FAMILY MEDICINE | Facility: CLINIC | Age: 51
End: 2021-01-05

## 2021-01-14 ENCOUNTER — OFFICE VISIT (OUTPATIENT)
Dept: FAMILY MEDICINE | Facility: CLINIC | Age: 51
End: 2021-01-14

## 2021-01-14 VITALS
BODY MASS INDEX: 20.88 KG/M2 | TEMPERATURE: 98 F | WEIGHT: 137.8 LBS | HEART RATE: 99 BPM | OXYGEN SATURATION: 99 % | DIASTOLIC BLOOD PRESSURE: 60 MMHG | HEIGHT: 68 IN | SYSTOLIC BLOOD PRESSURE: 116 MMHG | RESPIRATION RATE: 20 BRPM

## 2021-01-14 DIAGNOSIS — E03.8 OTHER SPECIFIED HYPOTHYROIDISM: ICD-10-CM

## 2021-01-14 DIAGNOSIS — E83.52 SERUM CALCIUM ELEVATED: ICD-10-CM

## 2021-01-14 DIAGNOSIS — Z13.220 SCREENING CHOLESTEROL LEVEL: ICD-10-CM

## 2021-01-14 DIAGNOSIS — Z11.59 NEED FOR HEPATITIS C SCREENING TEST: ICD-10-CM

## 2021-01-14 DIAGNOSIS — Z13.1 SCREENING FOR DIABETES MELLITUS: ICD-10-CM

## 2021-01-14 DIAGNOSIS — Z01.411 ENCOUNTER FOR GYNECOLOGICAL EXAMINATION WITH ABNORMAL FINDING: Primary | ICD-10-CM

## 2021-01-14 LAB
% GRANULOCYTES: 59.3 %
ALBUMIN SERPL-MCNC: 4.2 G/DL (ref 3.6–5.1)
ALBUMIN/GLOB SERPL: 1.4 {RATIO} (ref 1–2.5)
ALP SERPL-CCNC: 60 U/L (ref 33–130)
ALT 1742-6: 22 U/L (ref 0–32)
AST 1920-8: 23 U/L (ref 0–35)
BILIRUB SERPL-MCNC: 0.9 MG/DL (ref 0.2–1.2)
BUN SERPL-MCNC: 9 MG/DL (ref 7–25)
BUN/CREATININE RATIO: 9.8 (ref 6–22)
CALCIUM SERPL-MCNC: 9.2 MG/DL (ref 8.6–10.3)
CHLORIDE SERPLBLD-SCNC: 103.2 MMOL/L (ref 98–110)
CHOLEST SERPL-MCNC: 182 MG/DL (ref 0–199)
CHOLEST/HDLC SERPL: 4 {RATIO} (ref 0–5)
CO2 SERPL-SCNC: 30.8 MMOL/L (ref 20–32)
CREAT SERPL-MCNC: 0.92 MG/DL (ref 0.6–1.3)
GLOBULIN, CALCULATED - QUEST: 3.1 (ref 1.9–3.7)
GLUCOSE SERPL-MCNC: 101 MG/DL (ref 60–99)
HCT VFR BLD AUTO: 40.7 % (ref 35–47)
HDLC SERPL-MCNC: 44 MG/DL (ref 40–150)
HEMOGLOBIN: 13.8 G/DL (ref 11.7–15.7)
LDLC SERPL CALC-MCNC: 123 MG/DL (ref 0–130)
LYMPHOCYTES NFR BLD AUTO: 28.9 %
MCH RBC QN AUTO: 32.2 PG (ref 26–33)
MCHC RBC AUTO-ENTMCNC: 33.9 G/DL (ref 31–36)
MCV RBC AUTO: 95.1 FL (ref 78–100)
MONOCYTES NFR BLD AUTO: 11.8 %
PLATELET COUNT - QUEST: 145 10^9/L (ref 150–375)
POTASSIUM SERPL-SCNC: 3.89 MMOL/L (ref 3.5–5.3)
PROT SERPL-MCNC: 7.3 G/DL (ref 6.1–8.1)
RBC # BLD AUTO: 4.28 10*12/L (ref 3.8–5.2)
SODIUM SERPL-SCNC: 141.8 MMOL/L (ref 135–146)
TRIGL SERPL-MCNC: 75 MG/DL (ref 0–149)
WBC # BLD AUTO: 5.4 10*9/L (ref 4–11)

## 2021-01-14 PROCEDURE — 36415 COLL VENOUS BLD VENIPUNCTURE: CPT | Performed by: FAMILY MEDICINE

## 2021-01-14 PROCEDURE — 99396 PREV VISIT EST AGE 40-64: CPT | Performed by: FAMILY MEDICINE

## 2021-01-14 PROCEDURE — 86803 HEPATITIS C AB TEST: CPT | Mod: 90 | Performed by: FAMILY MEDICINE

## 2021-01-14 PROCEDURE — 83970 ASSAY OF PARATHORMONE: CPT | Mod: 90 | Performed by: FAMILY MEDICINE

## 2021-01-14 PROCEDURE — 80053 COMPREHEN METABOLIC PANEL: CPT | Performed by: FAMILY MEDICINE

## 2021-01-14 PROCEDURE — 80061 LIPID PANEL: CPT | Performed by: FAMILY MEDICINE

## 2021-01-14 PROCEDURE — 85025 COMPLETE CBC W/AUTO DIFF WBC: CPT | Performed by: FAMILY MEDICINE

## 2021-01-14 RX ORDER — LEVOTHYROXINE SODIUM 100 MCG
TABLET ORAL
COMMUNITY
Start: 2021-01-14 | End: 2022-05-18

## 2021-01-14 SDOH — ECONOMIC STABILITY: FOOD INSECURITY: WITHIN THE PAST 12 MONTHS, THE FOOD YOU BOUGHT JUST DIDN'T LAST AND YOU DIDN'T HAVE MONEY TO GET MORE.: NEVER TRUE

## 2021-01-14 SDOH — ECONOMIC STABILITY: TRANSPORTATION INSECURITY
IN THE PAST 12 MONTHS, HAS LACK OF TRANSPORTATION KEPT YOU FROM MEETINGS, WORK, OR FROM GETTING THINGS NEEDED FOR DAILY LIVING?: YES

## 2021-01-14 SDOH — ECONOMIC STABILITY: INCOME INSECURITY: HOW HARD IS IT FOR YOU TO PAY FOR THE VERY BASICS LIKE FOOD, HOUSING, MEDICAL CARE, AND HEATING?: NOT HARD AT ALL

## 2021-01-14 SDOH — ECONOMIC STABILITY: TRANSPORTATION INSECURITY
IN THE PAST 12 MONTHS, HAS THE LACK OF TRANSPORTATION KEPT YOU FROM MEDICAL APPOINTMENTS OR FROM GETTING MEDICATIONS?: YES

## 2021-01-14 SDOH — HEALTH STABILITY: MENTAL HEALTH: HOW OFTEN DO YOU HAVE A DRINK CONTAINING ALCOHOL?: MONTHLY OR LESS

## 2021-01-14 SDOH — ECONOMIC STABILITY: FOOD INSECURITY: WITHIN THE PAST 12 MONTHS, YOU WORRIED THAT YOUR FOOD WOULD RUN OUT BEFORE YOU GOT MONEY TO BUY MORE.: NEVER TRUE

## 2021-01-14 SDOH — HEALTH STABILITY: MENTAL HEALTH: HOW MANY STANDARD DRINKS CONTAINING ALCOHOL DO YOU HAVE ON A TYPICAL DAY?: 1 OR 2

## 2021-01-14 SDOH — HEALTH STABILITY: MENTAL HEALTH: HOW OFTEN DO YOU HAVE 6 OR MORE DRINKS ON ONE OCCASION?: NEVER

## 2021-01-14 ASSESSMENT — MIFFLIN-ST. JEOR: SCORE: 1283.56

## 2021-01-14 NOTE — NURSING NOTE
Patient is here for a full physical exam and discuss thyroid issues.    Pre-Visit Screening :  Immunizations : up to date  Colon Screening : is up to date  Mammogram: UTD  Asthma Action Test/Plan : NA  PHQ9 :  NA  GAD7 :  NA  Patient's  BMI Body mass index is 20.95 kg/m .  Questioned patient about current smoking habits.  Pt. has never smoked.  OK to leave a detailed voice message regarding today's visit Yes, phone # [phone]      ETOH screening: addressed in history

## 2021-01-14 NOTE — PROGRESS NOTES
SUBJECTIVE:  Elinor Hammond is an 50 year old  postmenopausal woman   who presents for fasting labs/ saw OB for gyn exam. Had pap this year/ following with Endocrinology for thyroid and parathyroid levels.Also seeing [facility]    Menopause at age 50. No   bleeding, spotting, or discharge noted.     Estrogen replacement therapy: never  RAMÓN exposure: no  History of abnormal Pap smear: No  Family history of uterine or ovarian cancer: No  Regular self breast exam: Yes  History of abnormal mammogram: No  Family history of breast cancer: No  History of abnormal lipids: No    Past Medical History:  2008: Hypothyroidism      Comment:   Problem list name updated by automated process.                Provider to review  No date: NO ACTIVE PROBLEMS    Review of patient's family history indicates:  Problem: Neurologic Disorder      Relation: Mother          Age of Onset: (Not Specified)          Comment: migraine  Problem: Lipids      Relation: Mother          Age of Onset: (Not Specified)  Problem: Diabetes      Relation: Mother          Age of Onset: (Not Specified)  Problem: Hypertension      Relation: Mother          Age of Onset: (Not Specified)  Problem: Cancer      Relation: Father          Age of Onset: (Not Specified)          Comment: skin  Problem: Lipids      Relation: Father          Age of Onset: (Not Specified)  Problem: Cancer      Relation: Paternal Grandmother          Age of Onset: (Not Specified)          Comment: lung  Problem: Cancer      Relation: Paternal Grandfather          Age of Onset: (Not Specified)          Comment: ?      Past Surgical History:  No date: HC RECONSTR NOSE+EMILY SEPTAL REPAIR  No date: HC TRACHELORRHAPHY, PLASTIC REPAIR UTERINE CERVIX, VAG   APPROACH      Comment:  cerclage/     Current Outpatient Medications:     fish oil-omega-3 fatty acids 1000 MG capsule     magnesium citrate solution     MULTIVITAMIN TABS   OR     SYNTHROID 100 MCG tablet    No current  "facility-administered medications for this visit.      -- Penicillins     --  rash   -- Sulfa Drugs     --  nausea and vomiting    Social History    Tobacco Use      Smoking status: Never Smoker      Smokeless tobacco: Never Used    Alcohol use: Yes      Alcohol/week: 0.8 standard drinks      Types: 1 Standard drinks or equivalent per week      Frequency: Monthly or less      Drinks per session: 1 or 2      Binge frequency: Never      Comment: occ      Review Of Systems  Ears/Nose/Throat: negative  Respiratory: No shortness of breath, dyspnea on exertion, cough, or hemoptysis  Cardiovascular: palpitations  Gastrointestinal: negative  Genitourinary: negative    OBJECTIVE:  /60 (BP Location: Left arm, Patient Position: Sitting, Cuff Size: Adult Regular)   Pulse 99   Temp 98  F (36.7  C) (Oral)   Ht 1.727 m (5' 8\")   Wt 62.5 kg (137 lb 12.8 oz)   LMP 03/20/2018 (Approximate)   SpO2 99%   BMI 20.95 kg/m    General appearance: healthy, alert, no distress, cooperative and smiling  Skin: Skin color, texture, turgor normal. No rashes or lesions.  Ears: negative  Nose/Sinuses: Nares normal. Septum midline. Mucosa normal. No drainage or sinus tenderness.  Oropharynx: Lips, mucosa, and tongue normal. Teeth and gums normal.  Neck: Neck supple. No adenopathy. Thyroid symmetric, normal size,, Carotids without bruits.  Lungs: negative, Percussion normal. Good diaphragmatic excursion. Lungs clear  Heart: negative, PMI normal. No lifts, heaves, or thrills. RRR. No murmurs, clicks gallops or rub  Breasts: deferred  Abdomen: Abdomen soft, non-tender. BS normal. No masses, organomegaly  Pelvic: Deferred  BMI : Body mass index is 20.95 kg/m .    ASSESSMENT:  (Z01.411) Encounter for gynecological examination with abnormal finding  (primary encounter diagnosis)  Plan: VENOUS COLLECTION, HEMOGRAM PLATELET DIFF         ([facility]), CANCELED: HEMOGLOBIN ([facility])        Exercise encouraged  Fasting lipid profile obtained  Follow up in 1 " year  Glucose obtained  Hemoglobin obtained  Routine breast self-exam encouraged  Seat belt use encouraged  Sunscreen recommended      (E03.8) Other specified hypothyroidism  Plan: HEMOGRAM PLATELET DIFF ([facility])        Back to endocrinology    (E83.52) Serum calcium elevated  Plan: Comprehensive Metobolic Panel ([facility]), VENOUS         COLLECTION, PTH , Intact         and Calcium ([facility])        Await labs/ Endocrinology      (Z13.1) Screening for diabetes mellitus  Plan: Comprehensive Metobolic Panel ([facility]), VENOUS         COLLECTION            (Z13.220) Screening cholesterol level  Plan: Lipid Panel ([facility]), VENOUS COLLECTION            (Z11.59) Need for hepatitis C screening test  Plan: Hepatits C antibody ([facility])              Dx:  1)  Pap smear, mammogram  2)  Lipids at appropriate intervals    PE:  Reviewed health maintenance including diet, regular exercise,   estrogen replacement and periodic exams.

## 2021-01-14 NOTE — PATIENT INSTRUCTIONS
Await labs    Exercise encouraged  Fasting lipid profile obtained  Hemoglobin obtained  Routine breast self-exam encouraged  Seat belt use encouraged  Sunscreen recommended

## 2021-01-15 LAB
CALCIUM SERPL-MCNC: 9.9 MG/DL (ref 8.6–10.4)
HCV AB - QUEST: NORMAL
PTH, INTACT: 34 PG/ML (ref 14–64)
SIGNAL TO CUT OFF - QUEST: 0.15

## 2021-01-19 ENCOUNTER — HOSPITAL ENCOUNTER (OUTPATIENT)
Dept: MAMMOGRAPHY | Facility: CLINIC | Age: 51
Discharge: HOME | End: 2021-01-19
Attending: FAMILY MEDICINE | Admitting: FAMILY MEDICINE
Payer: COMMERCIAL

## 2021-01-19 DIAGNOSIS — Z12.31 VISIT FOR SCREENING MAMMOGRAM: ICD-10-CM

## 2021-01-19 PROCEDURE — 77067 SCR MAMMO BI INCL CAD: CPT

## 2021-09-18 ENCOUNTER — HEALTH MAINTENANCE LETTER (OUTPATIENT)
Age: 51
End: 2021-09-18

## 2021-11-24 ENCOUNTER — TRANSFERRED RECORDS (OUTPATIENT)
Dept: FAMILY MEDICINE | Facility: CLINIC | Age: 51
End: 2021-11-24

## 2022-03-05 ENCOUNTER — HEALTH MAINTENANCE LETTER (OUTPATIENT)
Age: 52
End: 2022-03-05

## 2022-03-08 ENCOUNTER — HOSPITAL ENCOUNTER (OUTPATIENT)
Dept: MAMMOGRAPHY | Facility: CLINIC | Age: 52
Discharge: HOME | End: 2022-03-08
Admitting: RADIOLOGY
Payer: COMMERCIAL

## 2022-03-08 DIAGNOSIS — Z12.31 BREAST CANCER SCREENING BY MAMMOGRAM: ICD-10-CM

## 2022-03-08 PROCEDURE — 77067 SCR MAMMO BI INCL CAD: CPT

## 2022-05-18 ENCOUNTER — OFFICE VISIT (OUTPATIENT)
Dept: INTERNAL MEDICINE | Facility: CLINIC | Age: 52
End: 2022-05-18
Payer: COMMERCIAL

## 2022-05-18 VITALS
RESPIRATION RATE: 16 BRPM | BODY MASS INDEX: 21.01 KG/M2 | SYSTOLIC BLOOD PRESSURE: 122 MMHG | HEIGHT: 68 IN | OXYGEN SATURATION: 100 % | TEMPERATURE: 97.1 F | HEART RATE: 72 BPM | DIASTOLIC BLOOD PRESSURE: 58 MMHG | WEIGHT: 138.6 LBS

## 2022-05-18 DIAGNOSIS — R00.2 PALPITATIONS: Primary | ICD-10-CM

## 2022-05-18 DIAGNOSIS — J34.9 NASAL DISORDER: ICD-10-CM

## 2022-05-18 PROCEDURE — 99203 OFFICE O/P NEW LOW 30 MIN: CPT | Performed by: INTERNAL MEDICINE

## 2022-05-18 RX ORDER — ACETAMINOPHEN 160 MG
TABLET,DISINTEGRATING ORAL
COMMUNITY

## 2022-05-18 RX ORDER — LEVOTHYROXINE SODIUM 88 UG/1
TABLET ORAL
COMMUNITY
Start: 2021-10-27

## 2022-05-18 RX ORDER — ESTRADIOL 0.1 MG/G
CREAM VAGINAL
COMMUNITY
Start: 2022-05-13

## 2022-05-18 RX ORDER — MULTIVITAMIN WITH IRON
1 TABLET ORAL DAILY
COMMUNITY

## 2022-05-18 RX ORDER — MULTIVIT-MIN/FOLIC/VIT K/LYCOP 400-300MCG
TABLET ORAL
COMMUNITY

## 2022-05-18 NOTE — NURSING NOTE
"/58   Pulse 90   Temp 97.1  F (36.2  C) (Tympanic)   Resp 16   Ht 1.727 m (5' 8\")   Wt 62.9 kg (138 lb 9.6 oz)   LMP 03/20/2018 (Approximate)   SpO2 100%   BMI 21.07 kg/m    Patient in for consult on: Irritation in nose x's 2 months. \"Swollen tongue\" x's 1.5 years. Tachycardia x's 1.5 years  "

## 2022-05-18 NOTE — PROGRESS NOTES
Assessment & Plan     (R00.2) Palpitations  (primary encounter diagnosis)  Plan: Zio Patch Holter 48 Hour Adult Pediatric.pt was told I will contact her after results and proceed accordingly.            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 Nasal polyps. Referred to ENT for further evaluation and management.  Adult ENT  Referral           Patient was informed her blood pressures are normal at this time.    Ordering of each unique test  31 minutes spent on the date of the encounter doing chart review, history and exam, documentation and further activities per the note        Demetri Flynn MD  [facility] DARON Aldrich is a 51 year old who presents for the following health issues     History of Present Illness       Reason for visit:  Trouble nreathjng through nose, dry nasal passages,  varied blood pressure, heart rate increase,   Looking for a new pcp  Symptom onset:  More than a month  Symptoms include:  Difficulty breathing through dry nose, periods of elevated heart rate,  varied blood pressure  Symptom intensity:  Moderate  Symptom progression:  Staying the same  Had these symptoms before:  Yes  Has tried/received treatment for these symptoms:  No    She eats 4 or more servings of fruits and vegetables daily.She consumes 1 sweetened beverage(s) daily.She exercises with enough effort to increase her heart rate 20 to 29 minutes per day.  She exercises with enough effort to increase her heart rate 3 or less days per week.   She is taking medications regularly.       Pt is a 51 year old female who is seen here to day with c/o difficulty breathing through the nose since 2 yrs, both nostrils, no nasal congestion or drainage.  Has not seen any ENT in the past    Patient also complains of fluctuating blood pressure readings 118/70- 122/60 but blood pressure is normal at this time.    Patient also complains of increased heart rate since 2 years, patient states  "her heart rate used to be 60 and now is in  70s, patient has decreased caffeine intake and currently has decaf, no history of anxiety, has history of thyroid disease and had a recent TSH through her endocrinologist and normal.  Patient has seen cardiologist in the past and had had a normal echocardiogram,  She had Zio patch at that time but patient wore it for 2 days and did not complete the test and did not return it.    History of hypothyroidism follows up with endocrinologist and is on Levoxyl 88 mcg daily          Past Medical History:   Diagnosis Date     Hypothyroidism 02/27/2008     Problem list name updated by automated process. Provider to review       Current Outpatient Medications   Medication Sig Dispense Refill     Ascorbic Acid (VITAMIN C ADULT GUMMIES PO)        Barberry-Oreg Grape-Goldenseal (BERBERINE COMPLEX) 200-200-50 MG CAPS        Cholecalciferol (VITAMIN D3) 50 MCG (2000 UT) CAPS        Coenzyme Q10 (CO Q 10 PO)        estradiol (ESTRACE) 0.1 MG/GM vaginal cream        Folic Acid (FOLATE PO)        levothyroxine (SYNTHROID/LEVOTHROID) 88 MCG tablet Synthroid 88 mcg tablet   TAKE 1 TABLET BY MOUTH DAILY       magnesium 250 MG tablet Take 1 tablet by mouth daily       Magnesium Cl-Calcium Carbonate (SLOW MAGNESIUM/CALCIUM)  MG TBEC        Omega-3 Fatty Acids (FISH OIL PO)        Selenium (SELENIMIN PO)        vitamin B complex with vitamin C (STRESS TAB) tablet        ZINC CITRATE PO zinc            Review of Systems   CONSTITUTIONAL: NEGATIVE for fever, chills, change in weight  ENT/MOUTH: Difficulty breathing through nose  RESP: NEGATIVE for significant cough or SOB  CV: Racing heart otherwise NEGATIVE for chest pain,  or peripheral edema  ENDOCRINE: Hx thyroid disease  PSYCHIATRIC: NEGATIVE for changes in mood or affect      Objective    /58   Pulse 72   Temp 97.1  F (36.2  C) (Tympanic)   Resp 16   Ht 1.727 m (5' 8\")   Wt 62.9 kg (138 lb 9.6 oz)   LMP 03/20/2018 " (Approximate)   SpO2 100%   BMI 21.07 kg/m    Body mass index is 20.95 kg/m .  Physical Exam   GENERAL: healthy, alert and no distress  EYES: Eyes grossly normal to inspection, PERRL and conjunctivae and sclerae normal  RESP: lungs clear to auscultation - no rales, rhonchi or wheezes  CV: regular rate and rhythm, normal S1 S2,    MS: no gross musculoskeletal defects noted, no edema  NEURO: Normal strength and tone, mentation intact and speech normal

## 2022-05-27 ENCOUNTER — TELEPHONE (OUTPATIENT)
Dept: INTERNAL MEDICINE | Facility: CLINIC | Age: 52
End: 2022-05-27
Payer: COMMERCIAL

## 2022-05-27 DIAGNOSIS — R00.2 PALPITATIONS: Primary | ICD-10-CM

## 2022-05-27 NOTE — TELEPHONE ENCOUNTER
Joann from [facility] calls requesting clarification of order placed today.    Would provider like patient to receive a Zio Patch or a Holter Monitor?    Patient is scheduled for Tuesday May 31st. Clarified order is needed prior to hook up.

## 2022-05-27 NOTE — TELEPHONE ENCOUNTER
Call returned from Cardio-Pulmonary.    Clarified that it is a Zio Patch. Cardio-Pulm will ensure correct order.     Patient to wear Zio Patch for 48 hours per note.

## 2022-05-31 ENCOUNTER — HOSPITAL ENCOUNTER (OUTPATIENT)
Dept: CARDIOLOGY | Facility: CLINIC | Age: 52
Discharge: HOME | End: 2022-05-31
Attending: INTERNAL MEDICINE | Admitting: INTERNAL MEDICINE
Payer: COMMERCIAL

## 2022-05-31 DIAGNOSIS — R00.2 PALPITATIONS: ICD-10-CM

## 2022-05-31 PROCEDURE — 93244 EXT ECG>48HR<7D REV&INTERPJ: CPT | Performed by: INTERNAL MEDICINE

## 2022-05-31 PROCEDURE — 93246 EXT ECG>7D<15D RECORDING: CPT

## 2022-10-18 ENCOUNTER — ALLIED HEALTH/NURSE VISIT (OUTPATIENT)
Dept: FAMILY MEDICINE | Facility: CLINIC | Age: 52
End: 2022-10-18

## 2022-10-18 DIAGNOSIS — Z23 NEED FOR VACCINATION: Primary | ICD-10-CM

## 2022-10-18 PROCEDURE — 90686 IIV4 VACC NO PRSV 0.5 ML IM: CPT | Performed by: FAMILY MEDICINE

## 2022-10-18 PROCEDURE — 90471 IMMUNIZATION ADMIN: CPT | Performed by: FAMILY MEDICINE

## 2022-12-24 ENCOUNTER — HEALTH MAINTENANCE LETTER (OUTPATIENT)
Age: 52
End: 2022-12-24

## 2023-03-10 ENCOUNTER — HOSPITAL ENCOUNTER (OUTPATIENT)
Dept: MAMMOGRAPHY | Facility: CLINIC | Age: 53
Discharge: HOME | End: 2023-03-10
Attending: OBSTETRICS & GYNECOLOGY | Admitting: OBSTETRICS & GYNECOLOGY
Payer: COMMERCIAL

## 2023-03-10 DIAGNOSIS — Z12.31 VISIT FOR SCREENING MAMMOGRAM: ICD-10-CM

## 2023-03-10 PROCEDURE — 77067 SCR MAMMO BI INCL CAD: CPT

## 2023-06-28 ENCOUNTER — OFFICE VISIT (OUTPATIENT)
Dept: FAMILY MEDICINE | Facility: CLINIC | Age: 53
End: 2023-06-28

## 2023-06-28 VITALS
TEMPERATURE: 98 F | HEART RATE: 79 BPM | OXYGEN SATURATION: 99 % | WEIGHT: 141.6 LBS | BODY MASS INDEX: 21.46 KG/M2 | SYSTOLIC BLOOD PRESSURE: 118 MMHG | DIASTOLIC BLOOD PRESSURE: 70 MMHG | HEIGHT: 68 IN

## 2023-06-28 DIAGNOSIS — Z01.818 PREOPERATIVE EXAMINATION: Primary | ICD-10-CM

## 2023-06-28 DIAGNOSIS — M54.9 ACUTE BACK PAIN, UNSPECIFIED BACK LOCATION, UNSPECIFIED BACK PAIN LATERALITY: ICD-10-CM

## 2023-06-28 DIAGNOSIS — M25.561 ACUTE PAIN OF RIGHT KNEE: ICD-10-CM

## 2023-06-28 DIAGNOSIS — E03.8 OTHER SPECIFIED HYPOTHYROIDISM: ICD-10-CM

## 2023-06-28 PROBLEM — M79.605 BILATERAL LEG AND FOOT PAIN: Status: RESOLVED | Noted: 2018-06-05 | Resolved: 2023-06-28

## 2023-06-28 PROBLEM — M79.672 BILATERAL LEG AND FOOT PAIN: Status: RESOLVED | Noted: 2018-06-05 | Resolved: 2023-06-28

## 2023-06-28 PROBLEM — M79.604 BILATERAL LEG AND FOOT PAIN: Status: RESOLVED | Noted: 2018-06-05 | Resolved: 2023-06-28

## 2023-06-28 PROBLEM — M79.671 BILATERAL LEG AND FOOT PAIN: Status: RESOLVED | Noted: 2018-06-05 | Resolved: 2023-06-28

## 2023-06-28 LAB
% GRANULOCYTES: 59.2 %
BUN SERPL-MCNC: 15 MG/DL (ref 7–25)
BUN/CREATININE RATIO: 16.5 (ref 6–32)
CALCIUM SERPL-MCNC: 9.8 MG/DL (ref 8.6–10.3)
CHLORIDE SERPLBLD-SCNC: 101.5 MMOL/L (ref 98–110)
CO2 SERPL-SCNC: 32.3 MMOL/L (ref 20–32)
CREAT SERPL-MCNC: 0.91 MG/DL (ref 0.6–1.3)
GLUCOSE SERPL-MCNC: 91 MG/DL (ref 60–99)
HCT VFR BLD AUTO: 44.6 % (ref 35–47)
HEMOGLOBIN: 14.3 G/DL (ref 11.7–15.7)
LYMPHOCYTES NFR BLD AUTO: 33 %
MCH RBC QN AUTO: 31.7 PG (ref 26–33)
MCHC RBC AUTO-ENTMCNC: 32.1 G/DL (ref 31–36)
MCV RBC AUTO: 99 FL (ref 78–100)
MONOCYTES NFR BLD AUTO: 7.8 %
PLATELET COUNT - QUEST: 167 10^9/L (ref 150–375)
POTASSIUM SERPL-SCNC: 4.03 MMOL/L (ref 3.5–5.3)
RBC # BLD AUTO: 4.51 10*12/L (ref 3.8–5.2)
SODIUM SERPL-SCNC: 139.8 MMOL/L (ref 135–146)
WBC # BLD AUTO: 4.6 10*9/L (ref 4–11)

## 2023-06-28 PROCEDURE — 36415 COLL VENOUS BLD VENIPUNCTURE: CPT | Performed by: FAMILY MEDICINE

## 2023-06-28 PROCEDURE — 80048 BASIC METABOLIC PNL TOTAL CA: CPT | Performed by: FAMILY MEDICINE

## 2023-06-28 PROCEDURE — 85025 COMPLETE CBC W/AUTO DIFF WBC: CPT | Performed by: FAMILY MEDICINE

## 2023-06-28 PROCEDURE — 99214 OFFICE O/P EST MOD 30 MIN: CPT | Performed by: FAMILY MEDICINE

## 2023-06-28 NOTE — PROGRESS NOTES
[facility]YS[facility]  [address] [place] [address]  [address]  [facility] [address]  Phone: [phone]  Fax: [phone]  Primary Provider: Rosanne De Anda  Pre-op Performing Provider: ROSANNE DE ANDA      PREOPERATIVE EVALUATION:  Today's date: 2023    Elinor Hammond is a 52 year old female who presents for a preoperative evaluation. ( 68)    Surgical Information:  Surgery/Procedure: Right knee arthroscopy   Surgery Location: [facility]   Surgeon: Dr. Thompson   Surgery Date: 23  Time of Surgery: TBD  Where patient plans to recover: At home with family  Fax number for surgical facility: [phone]    Assessment & Plan     The proposed surgical procedure is considered INTERMEDIATE risk.    Problem List Items Addressed This Visit        Endocrine    Other specified hypothyroidism--followed by endocrinology   Other Visit Diagnoses     Preoperative examination    -  Primary    Relevant Orders    VENOUS COLLECTION (Completed)    Basic Metabolic Panel (BFP) (Completed)    HEMOGRAM PLATELET DIFF (BFP) (Completed)    Acute pain of right knee        Acute back pain, unspecified back location, unspecified back pain laterality                1. Preoperative examination    - VENOUS COLLECTION  - Basic Metabolic Panel (BFP)  - HEMOGRAM PLATELET DIFF (BFP)    2. Acute pain of right knee      3. Other specified hypothyroidism--followed by endocrinology      4. Acute back pain, unspecified back location, unspecified back pain laterality  She wanted a prescription medication, so I told her this would need a separate apt to discuss further. If pain is bad enough, she may consider postponing the surgery. She will evaluate next week     - No identified additional risk factors other than previously addressed    Antiplatelet or Anticoagulation Medication Instructions:   - Patient is on no antiplatelet or anticoagulation medications.    Additional Medication Instructions:  Patient is to take all scheduled  medications on the day of surgery    RECOMMENDATION:  APPROVAL GIVEN to proceed with proposed procedure, without further diagnostic evaluation.    Subjective       HPI related to upcoming procedure: here for preop for torn meniscus in 2 spots right knee. Symptoms include initially right knee buckled and fell, a lot of swelling and pain, most of the swelling has gone down. Mostly now just pain in the back of the knee. Injury probably mid April, went to ortho mid may and they thought maybe it's a strain.  But then still bothering her and played pickleball, then injury again. Felt the pain in the back of the knee. MRI shows the tears.     Since the injury, started having upper neck and back pain. Went to see the chiropractor.     1. No - Have you ever had a heart attack or stroke?  2. No - Have you ever had surgery on your heart or blood vessels, such as a stent, coronary (heart) bypass, or surgery on an artery in the head, neck, heart, or legs?  3. No - Do you have chest pain when you are physically active?  4. No - Do you have a history of heart failure?  5. No - Do you currently have a cold, bronchitis, or symptoms of other respiratory (head and chest) infections?  6. No - Do you have a cough, shortness of breath, or wheezing?  7. No - Do you or anyone in your family have a history of blood clots?  8. No - Do you or anyone in your family have a serious bleeding problem, such as long-lasting bleeding after surgeries or cuts?  9. No - Have you ever had anemia or been told to take iron pills?  10. No - Have you had any abnormal blood loss such as black, tarry or bloody stools, or abnormal vaginal bleeding?  11. No - Have you ever had a blood transfusion?  12. Yes - Are you willing to have a blood transfusion if it is medically needed before, during, or after your surgery?  13. No - Have you or anyone in your family ever had problems with anesthesia (sedation for surgery)?  14. No - Do you have sleep apnea, excessive  snoring, or daytime drowsiness?   15. No - Do you have any artifical heart valves or other implanted medical devices, such as a pacemaker, defibrillator, or continuous glucose monitor?  16. No - Do you have any artifical joints?  17. No - Are you allergic to latex?  18. No - Is there any chance that you may be pregnant?, no longer gets her period for 4 years.      Health Care Directive:  Patient does not have a Health Care Directive or Living Will: Patient states has Advance Directive and will bring in a copy to clinic.    Preoperative Review of :   reviewed - no record of controlled substances prescribed.      Status of Chronic Conditions:  HYPOTHYROIDISM - Patient has a longstanding history of chronic Hypothyroidism. Patient has been doing well, noting no tremor, insomnia, hair loss or changes in skin texture. Continues to take medications as directed, without adverse reactions or side effects. Last TSH   Lab Results   Component Value Date    TSH 2.96 12/02/2015   .        Review of Systems  CONSTITUTIONAL: NEGATIVE for fever, chills, change in weight  INTEGUMENTARY/SKIN: NEGATIVE for worrisome rashes, moles or lesions  EYES: NEGATIVE for vision changes or irritation  ENT/MOUTH: NEGATIVE for ear, mouth and throat problems  RESP: NEGATIVE for significant cough or SOB  CV: NEGATIVE for chest pain, palpitations or peripheral edema  GI: NEGATIVE for nausea, abdominal pain, heartburn, or change in bowel habits  : NEGATIVE for frequency, dysuria, or hematuria  MUSCULOSKELETAL: NEGATIVE for significant arthralgias or myalgia  NEURO: NEGATIVE for weakness, dizziness or paresthesias  ENDOCRINE: NEGATIVE for temperature intolerance, skin/hair changes  HEME: NEGATIVE for bleeding problems  PSYCHIATRIC: NEGATIVE for changes in mood or affect    Patient Active Problem List    Diagnosis Date Noted     Advance care planning 03/05/2015     Priority: Medium     Advance Care Planning:   ACP Review and Resources Provided:   Reviewed chart for advance care plan.  Elinor Hammond has no plan or code status on file. Discussed available resources and provided with information. Confirmed code status reflects current choices pending further ACP discussions.  Confirmed/documented designated decision maker(s). See permanent comments section of demographics in clinical tab.   Added by Alexandra Salcedo on 3/5/2015             Health Care Home 2013     Priority: Medium     State Tier Level:  Tier 0  Status:  n/a  Care Coordinator:  See Letters for [facility] Care Plan           Goiter 2009     Priority: Medium     Other specified hypothyroidism--followed by endocrinology 2008     Priority: Medium     Problem list name updated by automated process. Provider to review        Past Medical History:   Diagnosis Date     Hypothyroidism 2008     Problem list name updated by automated process. Provider to review     Past Surgical History:   Procedure Laterality Date     HC RECONSTR NOSE+EMILY SEPTAL REPAIR       HC TRACHELORRHAPHY, PLASTIC REPAIR UTERINE CERVIX, VAG APPROACH      cerclage/      Current Outpatient Medications   Medication Sig Dispense Refill     Ascorbic Acid (VITAMIN C ADULT GUMMIES PO)        Barberry-Oreg Grape-Goldenseal (BERBERINE COMPLEX) 200-200-50 MG CAPS        Cholecalciferol (VITAMIN D3) 50 MCG (2000) CAPS        Coenzyme Q10 (CO Q 10 PO)        estradiol (ESTRACE) 0.1 MG/GM vaginal cream        Folic Acid (FOLATE PO)        levothyroxine (SYNTHROID/LEVOTHROID) 88 MCG tablet Synthroid 88 mcg tablet   TAKE 1 TABLET BY MOUTH DAILY       magnesium 250 MG tablet Take 1 tablet by mouth daily       Magnesium Cl-Calcium Carbonate (SLOW MAGNESIUM/CALCIUM)  MG TBEC        Omega-3 Fatty Acids (FISH OIL PO)        Selenium (SELENIMIN PO)        vitamin B complex with vitamin C (STRESS TAB) tablet        ZINC CITRATE PO zinc         Allergies   Allergen Reactions     Penicillins      rash     Sulfa Antibiotics   "    nausea and vomiting        Social History     Tobacco Use     Smoking status: Never     Passive exposure: Never     Smokeless tobacco: Never   Substance Use Topics     Alcohol use: Yes     Alcohol/week: 0.8 standard drinks of alcohol     Types: 1 Standard drinks or equivalent per week     Comment: occ     Family History   Problem Relation Age of Onset     Neurologic Disorder Mother         migrain     Lipids Mother      Diabetes Mother      Hypertension Mother      Cancer Father         skin     Lipids Father      Cancer Paternal Grandmother         lung     Cancer Paternal Grandfather         ?     History   Drug Use No         Objective     /70 (BP Location: Left arm, Patient Position: Sitting, Cuff Size: Adult Regular)   Pulse 79   Temp 98  F (36.7  C) (Temporal)   Ht 1.727 m (5' 8\")   Wt 64.2 kg (141 lb 9.6 oz)   LMP 03/20/2018 (Approximate)   SpO2 99%   BMI 21.53 kg/m      Physical Exam    GENERAL APPEARANCE: healthy, alert and no distress     EYES: EOMI, PERRL     HENT: ear canals and TM's normal and nose and mouth without ulcers or lesions     NECK: no adenopathy, no asymmetry, masses, or scars and thyroid normal to palpation     RESP: lungs clear to auscultation - no rales, rhonchi or wheezes     CV: regular rates and rhythm, normal S1 S2, no S3 or S4 and no murmur, click or rub     ABDOMEN:  soft, nontender, no HSM or masses and bowel sounds normal     MS: extremities normal- no gross deformities noted, no evidence of inflammation in joints, FROM in all extremities.     SKIN: no suspicious lesions or rashes     NEURO: Normal strength and tone, sensory exam grossly normal, mentation intact and speech normal     PSYCH: mentation appears normal. and affect normal/bright     LYMPHATICS: No cervical adenopathy    No results for input(s): HGB, PLT, INR, NA, POTASSIUM, CR, A1C in the last 72546 hours.     Diagnostics:  Recent Results (from the past 240 hour(s))   Basic Metabolic Panel (BFP)    " Collection Time: 06/28/23 12:00 AM   Result Value Ref Range    Carbon Dioxide 32.3 (A) 20 - 32 mmol/L    Creatinine 0.91 0.60 - 1.30 mg/dL    Glucose 91 60 - 99 mg/dL    Sodium 139.8 135 - 146 mmol/L    Potassium 4.03 3.5 - 5.3 mmol/L    Chloride 101.5 98 - 110 mmol/L    Urea Nitrogen 15 7 - 25 mg/dL    Calcium 9.8 8.6 - 10.3 mg/dL    BUN/Creatinine Ratio 16.5 6 - 32   HEMOGRAM PLATELET DIFF (BFP)    Collection Time: 06/28/23  1:45 PM   Result Value Ref Range    WBC 4.6 4.0 - 11 10*9/L    RBC Count 4.51 3.8 - 5.2 10*12/L    Hemoglobin 14.3 11.7 - 15.7 g/dL    Hematocrit 44.6 35.0 - 47.0 %    MCV 99.0 78 - 100 fL    MCH 31.7 26 - 33 pg    MCHC 32.1 31 - 36 g/dL    Platelet Count 167 150 - 375 10^9/L    % Granulocytes 59.2 %    % Lymphocytes 33.0 %    % Monocytes 7.8 %          Revised Cardiac Risk Index (RCRI):  The patient has the following serious cardiovascular risks for perioperative complications:   - No serious cardiac risks = 0 points     RCRI Interpretation: 0 points: Class I (very low risk - 0.4% complication rate)           Signed Electronically by: Rosanne De Anda MD  Copy of this evaluation report is provided to requesting physician.

## 2023-06-28 NOTE — NURSING NOTE
Chief Complaint   Patient presents with     Pre-Op Exam     Surgery/Procedure: Right knee arthroscopy   Surgery Location: [facility]   Surgeon: Dr. Thompson   Surgery Date: 07/06/23

## 2023-07-25 ASSESSMENT — ACTIVITIES OF DAILY LIVING (ADL)
KNEE_ACTIVITY_OF_DAILY_LIVING_SUM: 28
RAW_SCORE: 28
WALK: ACTIVITY IS FAIRLY DIFFICULT
SWELLING: THE SYMPTOM AFFECTS MY ACTIVITY MODERATELY
KNEE_ACTIVITY_OF_DAILY_LIVING_SCORE: 40
GO DOWN STAIRS: ACTIVITY IS FAIRLY DIFFICULT
GIVING WAY, BUCKLING OR SHIFTING OF KNEE: I DO NOT HAVE THE SYMPTOM
SIT WITH YOUR KNEE BENT: I AM UNABLE TO DO THE ACTIVITY
STAND: ACTIVITY IS MINIMALLY DIFFICULT
SQUAT: I AM UNABLE TO DO THE ACTIVITY
WEAKNESS: THE SYMPTOM AFFECTS MY ACTIVITY SEVERELY
LIMPING: THE SYMPTOM AFFECTS MY ACTIVITY MODERATELY
PAIN: THE SYMPTOM AFFECTS MY ACTIVITY MODERATELY
KNEEL ON THE FRONT OF YOUR KNEE: I AM UNABLE TO DO THE ACTIVITY
STIFFNESS: THE SYMPTOM AFFECTS MY ACTIVITY SEVERELY
RISE FROM A CHAIR: ACTIVITY IS NOT DIFFICULT
HOW_WOULD_YOU_RATE_THE_OVERALL_FUNCTION_OF_YOUR_KNEE_DURING_YOUR_USUAL_DAILY_ACTIVITIES?: ABNORMAL
GO UP STAIRS: ACTIVITY IS FAIRLY DIFFICULT
AS_A_RESULT_OF_YOUR_KNEE_INJURY,_HOW_WOULD_YOU_RATE_YOUR_CURRENT_LEVEL_OF_DAILY_ACTIVITY?: ABNORMAL

## 2023-07-27 ENCOUNTER — TRANSCRIBE ORDERS (OUTPATIENT)
Dept: OTHER | Age: 53
End: 2023-07-27

## 2023-07-27 ENCOUNTER — THERAPY VISIT (OUTPATIENT)
Dept: PHYSICAL THERAPY | Facility: CLINIC | Age: 53
End: 2023-07-27
Payer: COMMERCIAL

## 2023-07-27 DIAGNOSIS — Z98.890 STATUS POST ARTHROSCOPY OF RIGHT KNEE: Primary | ICD-10-CM

## 2023-07-27 DIAGNOSIS — S83.206A TEAR OF MENISCUS OF RIGHT KNEE: Primary | ICD-10-CM

## 2023-07-27 PROCEDURE — 97161 PT EVAL LOW COMPLEX 20 MIN: CPT | Mod: GP | Performed by: PHYSICAL THERAPIST

## 2023-07-27 PROCEDURE — 97010 HOT OR COLD PACKS THERAPY: CPT | Mod: GP | Performed by: PHYSICAL THERAPIST

## 2023-07-27 PROCEDURE — 97110 THERAPEUTIC EXERCISES: CPT | Mod: GP | Performed by: PHYSICAL THERAPIST

## 2023-07-27 NOTE — PROGRESS NOTES
PHYSICAL THERAPY EVALUATION  Type of Visit: Evaluation    See electronic medical record for Abuse and Falls Screening details.    Subjective       Presenting condition or subjective complaint: Post op (knee arthroscopy/meniscectomy) rehab  Date of onset: 07/06/23    Relevant medical history:     Dates & types of surgery: July 6, 2023 Knee arthroscope menisectomy    Prior diagnostic imaging/testing results: MRI; X-ray     Prior therapy history for the same diagnosis, illness or injury: No      Prior Level of Function  Transfers: Independent  Ambulation: Independent  ADL: Independent  IADL:     Living Environment  Social support: With a significant other or spouse   Type of home: House   Stairs to enter the home: No       Ramp: No   Stairs inside the home: Yes 15 Is there a railing: Yes   Help at home: None  Equipment owned: Crutches     Employment: Not Applicable    Hobbies/Interests:      Patient goals for therapy: Gain strength, range of motion and reduce stiffness    Pain assessment: Pain present     Objective   KNEE EVALUATION  PAIN: Pain Level at Rest: 2/10  Pain Level with Use: 5/10  Pain Location: post knee, patellar tendon, and lat hamstring  Pain is Exacerbated By: walking, stairs, bending, straightening  INTEGUMENTARY (edema, incisions):  incisions healing well  POSTURE: WNL  GAIT:  Weightbearing Status: WBAT  Assistive Device(s): None  Gait Deviations:  lack of knee ext of right knee  BALANCE/PROPRIOCEPTION:   WEIGHTBEARING ALIGNMENT: WNL  NON-WEIGHTBEARING ALIGNMENT:   ROM:   (Degrees) Left AROM Left PROM  Right AROM Right PROM   Knee Flexion  142  109   Knee Extension  2 hyper  8   Pain:   End feel:     STRENGTH:  fair quad set  FLEXIBILITY:   SPECIAL TESTS:   FUNCTIONAL TESTS:   PALPATION:  mild edema at the incision sites  JOINT MOBILITY:     Assessment & Plan   CLINICAL IMPRESSIONS  Medical Diagnosis:      Treatment Diagnosis:     Impression/Assessment: Patient is a 53 year old female with right knee  complaints.  The following significant findings have been identified: Pain, Decreased ROM/flexibility, Decreased strength, and Edema. These impairments interfere with their ability to perform recreational activities, household chores, household mobility, and community mobility as compared to previous level of function.     Clinical Decision Making (Complexity):  Clinical Presentation: Stable/Uncomplicated  Clinical Presentation Rationale: based on medical and personal factors listed in PT evaluation  Clinical Decision Making (Complexity): Low complexity    PLAN OF CARE  Treatment Interventions:  Modalities: Cryotherapy  Interventions: Manual Therapy, Therapeutic Exercise    Long Term Goals            Frequency of Treatment:    Duration of Treatment:      Recommended Referrals to Other Professionals:   Education Assessment:        Risks and benefits of evaluation/treatment have been explained.   Patient/Family/caregiver agrees with Plan of Care.     Evaluation Time:             Signing Clinician: Vinnie Shirley PT

## 2023-07-31 ENCOUNTER — THERAPY VISIT (OUTPATIENT)
Dept: PHYSICAL THERAPY | Facility: CLINIC | Age: 53
End: 2023-07-31
Payer: COMMERCIAL

## 2023-07-31 DIAGNOSIS — Z47.89 AFTERCARE FOLLOWING SURGERY OF THE MUSCULOSKELETAL SYSTEM: ICD-10-CM

## 2023-07-31 DIAGNOSIS — S83.206A TEAR OF MENISCUS OF RIGHT KNEE: Primary | ICD-10-CM

## 2023-07-31 PROCEDURE — 97010 HOT OR COLD PACKS THERAPY: CPT | Mod: GP | Performed by: PHYSICAL THERAPIST

## 2023-07-31 PROCEDURE — 97110 THERAPEUTIC EXERCISES: CPT | Mod: GP | Performed by: PHYSICAL THERAPIST

## 2023-08-03 ENCOUNTER — THERAPY VISIT (OUTPATIENT)
Dept: PHYSICAL THERAPY | Facility: CLINIC | Age: 53
End: 2023-08-03
Payer: COMMERCIAL

## 2023-08-03 DIAGNOSIS — Z47.89 AFTERCARE FOLLOWING SURGERY OF THE MUSCULOSKELETAL SYSTEM: ICD-10-CM

## 2023-08-03 DIAGNOSIS — S83.206A TEAR OF MENISCUS OF RIGHT KNEE: Primary | ICD-10-CM

## 2023-08-03 PROCEDURE — 97010 HOT OR COLD PACKS THERAPY: CPT | Mod: GP | Performed by: PHYSICAL THERAPIST

## 2023-08-03 PROCEDURE — 97110 THERAPEUTIC EXERCISES: CPT | Mod: GP | Performed by: PHYSICAL THERAPIST

## 2023-08-07 ENCOUNTER — THERAPY VISIT (OUTPATIENT)
Dept: PHYSICAL THERAPY | Facility: CLINIC | Age: 53
End: 2023-08-07
Payer: COMMERCIAL

## 2023-08-07 DIAGNOSIS — Z47.89 AFTERCARE FOLLOWING SURGERY OF THE MUSCULOSKELETAL SYSTEM: ICD-10-CM

## 2023-08-07 DIAGNOSIS — S83.206A TEAR OF MENISCUS OF RIGHT KNEE: Primary | ICD-10-CM

## 2023-08-07 PROCEDURE — 97530 THERAPEUTIC ACTIVITIES: CPT | Mod: GP | Performed by: PHYSICAL THERAPIST

## 2023-08-07 PROCEDURE — 97110 THERAPEUTIC EXERCISES: CPT | Mod: GP | Performed by: PHYSICAL THERAPIST

## 2023-08-10 ENCOUNTER — THERAPY VISIT (OUTPATIENT)
Dept: PHYSICAL THERAPY | Facility: CLINIC | Age: 53
End: 2023-08-10
Payer: COMMERCIAL

## 2023-08-10 DIAGNOSIS — S83.206A TEAR OF MENISCUS OF RIGHT KNEE: Primary | ICD-10-CM

## 2023-08-10 DIAGNOSIS — Z47.89 AFTERCARE FOLLOWING SURGERY OF THE MUSCULOSKELETAL SYSTEM: ICD-10-CM

## 2023-08-10 PROCEDURE — 97530 THERAPEUTIC ACTIVITIES: CPT | Mod: GP | Performed by: PHYSICAL THERAPIST

## 2023-08-10 PROCEDURE — 97110 THERAPEUTIC EXERCISES: CPT | Mod: 59 | Performed by: PHYSICAL THERAPIST

## 2023-08-17 ENCOUNTER — THERAPY VISIT (OUTPATIENT)
Dept: PHYSICAL THERAPY | Facility: CLINIC | Age: 53
End: 2023-08-17
Payer: COMMERCIAL

## 2023-08-17 DIAGNOSIS — Z47.89 AFTERCARE FOLLOWING SURGERY OF THE MUSCULOSKELETAL SYSTEM: ICD-10-CM

## 2023-08-17 DIAGNOSIS — S83.206A TEAR OF MENISCUS OF RIGHT KNEE: Primary | ICD-10-CM

## 2023-08-17 PROCEDURE — 97110 THERAPEUTIC EXERCISES: CPT | Mod: GP

## 2023-08-17 PROCEDURE — 97530 THERAPEUTIC ACTIVITIES: CPT | Mod: GP

## 2023-08-17 PROCEDURE — 97010 HOT OR COLD PACKS THERAPY: CPT | Mod: GP

## 2023-08-30 ENCOUNTER — THERAPY VISIT (OUTPATIENT)
Dept: PHYSICAL THERAPY | Facility: CLINIC | Age: 53
End: 2023-08-30
Payer: COMMERCIAL

## 2023-08-30 DIAGNOSIS — S83.206A TEAR OF MENISCUS OF RIGHT KNEE: Primary | ICD-10-CM

## 2023-08-30 DIAGNOSIS — Z47.89 AFTERCARE FOLLOWING SURGERY OF THE MUSCULOSKELETAL SYSTEM: ICD-10-CM

## 2023-08-30 PROCEDURE — 97010 HOT OR COLD PACKS THERAPY: CPT | Mod: GP | Performed by: PHYSICAL THERAPIST

## 2023-08-30 PROCEDURE — 97110 THERAPEUTIC EXERCISES: CPT | Mod: GP | Performed by: PHYSICAL THERAPIST

## 2023-08-30 PROCEDURE — 97530 THERAPEUTIC ACTIVITIES: CPT | Mod: GP | Performed by: PHYSICAL THERAPIST

## 2023-09-01 ENCOUNTER — THERAPY VISIT (OUTPATIENT)
Dept: PHYSICAL THERAPY | Facility: CLINIC | Age: 53
End: 2023-09-01
Payer: COMMERCIAL

## 2023-09-01 DIAGNOSIS — S83.206A TEAR OF MENISCUS OF RIGHT KNEE: Primary | ICD-10-CM

## 2023-09-01 DIAGNOSIS — Z47.89 AFTERCARE FOLLOWING SURGERY OF THE MUSCULOSKELETAL SYSTEM: ICD-10-CM

## 2023-09-01 PROCEDURE — 97010 HOT OR COLD PACKS THERAPY: CPT | Mod: GP | Performed by: PHYSICAL THERAPIST

## 2023-09-01 PROCEDURE — 97110 THERAPEUTIC EXERCISES: CPT | Mod: GP | Performed by: PHYSICAL THERAPIST

## 2023-09-01 PROCEDURE — 97530 THERAPEUTIC ACTIVITIES: CPT | Mod: GP | Performed by: PHYSICAL THERAPIST

## 2023-09-06 ENCOUNTER — THERAPY VISIT (OUTPATIENT)
Dept: PHYSICAL THERAPY | Facility: CLINIC | Age: 53
End: 2023-09-06
Payer: COMMERCIAL

## 2023-09-06 DIAGNOSIS — Z47.89 AFTERCARE FOLLOWING SURGERY OF THE MUSCULOSKELETAL SYSTEM: ICD-10-CM

## 2023-09-06 DIAGNOSIS — S83.206A TEAR OF MENISCUS OF RIGHT KNEE: Primary | ICD-10-CM

## 2023-09-06 PROCEDURE — 97010 HOT OR COLD PACKS THERAPY: CPT | Mod: GP | Performed by: PHYSICAL THERAPIST

## 2023-09-06 PROCEDURE — 97110 THERAPEUTIC EXERCISES: CPT | Mod: GP | Performed by: PHYSICAL THERAPIST

## 2023-09-06 PROCEDURE — 97530 THERAPEUTIC ACTIVITIES: CPT | Mod: GP | Performed by: PHYSICAL THERAPIST

## 2023-09-28 ENCOUNTER — THERAPY VISIT (OUTPATIENT)
Dept: PHYSICAL THERAPY | Facility: CLINIC | Age: 53
End: 2023-09-28
Payer: COMMERCIAL

## 2023-09-28 DIAGNOSIS — Z47.89 AFTERCARE FOLLOWING SURGERY OF THE MUSCULOSKELETAL SYSTEM: ICD-10-CM

## 2023-09-28 DIAGNOSIS — S83.206A TEAR OF MENISCUS OF RIGHT KNEE: Primary | ICD-10-CM

## 2023-09-28 PROCEDURE — 97110 THERAPEUTIC EXERCISES: CPT | Mod: GP | Performed by: PHYSICAL THERAPIST

## 2023-09-28 PROCEDURE — 97010 HOT OR COLD PACKS THERAPY: CPT | Mod: GP | Performed by: PHYSICAL THERAPIST

## 2023-09-28 PROCEDURE — 97530 THERAPEUTIC ACTIVITIES: CPT | Mod: GP | Performed by: PHYSICAL THERAPIST

## 2023-10-05 ENCOUNTER — THERAPY VISIT (OUTPATIENT)
Dept: PHYSICAL THERAPY | Facility: CLINIC | Age: 53
End: 2023-10-05
Payer: COMMERCIAL

## 2023-10-05 DIAGNOSIS — S83.206A TEAR OF MENISCUS OF RIGHT KNEE: Primary | ICD-10-CM

## 2023-10-05 DIAGNOSIS — Z47.89 AFTERCARE FOLLOWING SURGERY OF THE MUSCULOSKELETAL SYSTEM: ICD-10-CM

## 2023-10-05 PROCEDURE — 97530 THERAPEUTIC ACTIVITIES: CPT | Mod: GP | Performed by: PHYSICAL THERAPIST

## 2023-10-05 PROCEDURE — 97110 THERAPEUTIC EXERCISES: CPT | Mod: GP | Performed by: PHYSICAL THERAPIST

## 2023-10-05 PROCEDURE — 97010 HOT OR COLD PACKS THERAPY: CPT | Mod: GP | Performed by: PHYSICAL THERAPIST

## 2023-10-05 ASSESSMENT — ACTIVITIES OF DAILY LIVING (ADL)
KNEE_ACTIVITY_OF_DAILY_LIVING_SUM: 63
RISE FROM A CHAIR: ACTIVITY IS NOT DIFFICULT
KNEE_ACTIVITY_OF_DAILY_LIVING_SCORE: 90
WALK: ACTIVITY IS NOT DIFFICULT
SQUAT: ACTIVITY IS MINIMALLY DIFFICULT
WEAKNESS: I HAVE THE SYMPTOM BUT IT DOES NOT AFFECT MY ACTIVITY
SIT WITH YOUR KNEE BENT: ACTIVITY IS NOT DIFFICULT
GO DOWN STAIRS: ACTIVITY IS MINIMALLY DIFFICULT
HOW_WOULD_YOU_RATE_THE_OVERALL_FUNCTION_OF_YOUR_KNEE_DURING_YOUR_USUAL_DAILY_ACTIVITIES?: NEARLY NORMAL
AS_A_RESULT_OF_YOUR_KNEE_INJURY,_HOW_WOULD_YOU_RATE_YOUR_CURRENT_LEVEL_OF_DAILY_ACTIVITY?: NEARLY NORMAL
GO UP STAIRS: ACTIVITY IS NOT DIFFICULT
HOW_WOULD_YOU_RATE_THE_CURRENT_FUNCTION_OF_YOUR_KNEE_DURING_YOUR_USUAL_DAILY_ACTIVITIES_ON_A_SCALE_FROM_0_TO_100_WITH_100_BEING_YOUR_LEVEL_OF_KNEE_FUNCTION_PRIOR_TO_YOUR_INJURY_AND_0_BEING_THE_INABILITY_TO_PERFORM_ANY_OF_YOUR_USUAL_DAILY_ACTIVITIES?: 80
RAW_SCORE: 63
LIMPING: I DO NOT HAVE THE SYMPTOM
PAIN: I HAVE THE SYMPTOM BUT IT DOES NOT AFFECT MY ACTIVITY
STAND: ACTIVITY IS NOT DIFFICULT
STIFFNESS: I HAVE THE SYMPTOM BUT IT DOES NOT AFFECT MY ACTIVITY
GIVING WAY, BUCKLING OR SHIFTING OF KNEE: I DO NOT HAVE THE SYMPTOM
KNEEL ON THE FRONT OF YOUR KNEE: ACTIVITY IS MINIMALLY DIFFICULT
SWELLING: I HAVE THE SYMPTOM BUT IT DOES NOT AFFECT MY ACTIVITY

## 2023-10-05 NOTE — PROGRESS NOTES
DISCHARGE  Reason for Discharge: Patient has met all goals.    Equipment Issued: none    Discharge Plan: Patient to continue home program.    Referring Provider:  eMlo Thompson        10/05/23 0500   Appointment Info   Signing clinician's name / credentials Vinnie Shirley PT   Total/Authorized Visits 12 (MD)   Visits Used 11   Medical Diagnosis s/p right knee scope menisectomy   PT Tx Diagnosis s/p right knee scope menisectomy   Progress Note/Certification   Onset of illness/injury or Date of Surgery 07/06/23   Therapy Frequency 2x/week, then 1x/week   Predicted Duration 6 weeks   Progress Note Completed Date 10/05/23   PT Goal 1   Goal Identifier Stairs   Goal Description Ability to ascend and descend stairs reciprocally and painfree   Rationale to maximize safety and independence within the community;to maximize safety and independence within the home   Goal Progress Reciprocal gait up and down stairs   Target Date 09/07/23   Date Met 09/28/23   Subjective Report   Subjective Report Reciprocal gait on stairs.  Walking 3 miles now.  Minimal swelling now and feels she can extend her knee nearly full. Feels she is walking normally again.  Does get some pain in her shin and ankle after exercise at times.   Objective Measures   Objective Measures Objective Measure 1   Objective Measure 1   Objective Measure PROM:   Details Gait is normal.  Knee ROM is equal to left with tightness.  Good strength shown with exercises.   PT Modalities   PT Modalities Cryotherapy   Cryotherapy   Cryotherapy Minutes (15811) 10   Ice -Type Pack   Duration 10-12 min  (5 min on ext stretch)   Location Right knee   Treatment Interventions (PT)   Interventions Therapeutic Procedure/Exercise;Therapeutic Activity   Therapeutic Procedure/Exercise   Therapeutic Procedures: strength, endurance, ROM, flexibillity minutes (77816) 30   Therapeutic Procedures Ther Proc 2;Ther Proc 3;Ther Proc 4;Ther Proc 5;Ther Proc 6;Ther Proc 7;Ther Proc  "8;Ther Proc 9;Ther Proc 10   Ther Proc 1 Bike (SH=7)   Ther Proc 1 - Details 5 min/warm-up   Ther Proc 2 Leg press (SH=5)   Ther Proc 2 - Details 3x10 115#   Ther Proc 3 Seated HS curls   Ther Proc 3 - Details 3x10 green   Ther Proc 4 Functional knee ext   Ther Proc 4 - Details 20x black   Ther Proc 5 Sidesteps with band below knees   Ther Proc 5 - Details 3x10 steps red   Ther Proc 6 Manual knee ext stretch in supine by PT   Ther Proc 6 - Details not needed   Ther Proc 7 SL leg press   Ther Proc 7 - Details 3x10 50#   Ther Proc 8 HS curls with exer ball   Ther Proc 8 - Details 2x10   Ther Proc 9 Standing HS and gastroc stretch   Ther Proc 9 - Details 3x10\"   Ther Proc 10 Pulleys   Ther Proc 10 - Details not needed   Skilled Intervention Verbal, visual, and manual cueing   Therapeutic Activity   Therapeutic Activities: dynamic activities to improve functional performance minutes (56661) 12   Therapeutic Activities Ther Act 2;Ther Act 3;Ther Act 4;Ther Act 5   Ther Act 1 Knee bends   Ther Act 1 - Details 3x10 (on BOSU black side)   Ther Act 2 Lunges   Ther Act 2 - Details Step fwd and step back: 10x each bilat   Ther Act 3 Reformer leg press   Ther Act 3 - Details 30x 4 spr   Ther Act 4 SL balance on black side of BOSU   Ther Act 4 - Details 2x30\" EO   Ther Act 5 Step downs   Ther Act 5 - Details 2x15 4\"   Skilled Intervention Verbal, visual cueing   Plan   Plan for next session DC with HEP   Total Session Time   Timed Code Treatment Minutes 42   Total Treatment Time (sum of timed and untimed services) 52     "

## 2023-10-19 ENCOUNTER — LAB REQUISITION (OUTPATIENT)
Dept: LAB | Facility: CLINIC | Age: 53
End: 2023-10-19

## 2023-10-19 DIAGNOSIS — G47.09 OTHER INSOMNIA: ICD-10-CM

## 2023-10-19 DIAGNOSIS — R00.9 UNSPECIFIED ABNORMALITIES OF HEART BEAT: ICD-10-CM

## 2023-10-19 DIAGNOSIS — E03.9 HYPOTHYROIDISM, UNSPECIFIED: ICD-10-CM

## 2023-10-19 LAB
ALBUMIN SERPL BCG-MCNC: 4.3 G/DL (ref 3.5–5.2)
ALP SERPL-CCNC: 61 U/L (ref 35–104)
ALT SERPL W P-5'-P-CCNC: 29 U/L (ref 0–50)
ANION GAP SERPL CALCULATED.3IONS-SCNC: 8 MMOL/L (ref 7–15)
AST SERPL W P-5'-P-CCNC: 38 U/L (ref 0–45)
BILIRUB SERPL-MCNC: 0.6 MG/DL
BUN SERPL-MCNC: 14.7 MG/DL (ref 6–20)
CALCIUM SERPL-MCNC: 9.5 MG/DL (ref 8.6–10)
CHLORIDE SERPL-SCNC: 103 MMOL/L (ref 98–107)
CREAT SERPL-MCNC: 0.85 MG/DL (ref 0.51–0.95)
DEPRECATED HCO3 PLAS-SCNC: 29 MMOL/L (ref 22–29)
EGFRCR SERPLBLD CKD-EPI 2021: 80 ML/MIN/1.73M2
ERYTHROCYTE [DISTWIDTH] IN BLOOD BY AUTOMATED COUNT: 12.3 % (ref 10–15)
GLUCOSE SERPL-MCNC: 82 MG/DL (ref 70–99)
HCT VFR BLD AUTO: 40.9 % (ref 35–47)
HGB BLD-MCNC: 13.5 G/DL (ref 11.7–15.7)
HOLD SPECIMEN: NORMAL
HOLD SPECIMEN: NORMAL
MAGNESIUM SERPL-MCNC: 2.2 MG/DL (ref 1.7–2.3)
MCH RBC QN AUTO: 31.5 PG (ref 26.5–33)
MCHC RBC AUTO-ENTMCNC: 33 G/DL (ref 31.5–36.5)
MCV RBC AUTO: 95 FL (ref 78–100)
PLATELET # BLD AUTO: 162 10E3/UL (ref 150–450)
POTASSIUM SERPL-SCNC: 4.3 MMOL/L (ref 3.4–5.3)
PROT SERPL-MCNC: 7 G/DL (ref 6.4–8.3)
RBC # BLD AUTO: 4.29 10E6/UL (ref 3.8–5.2)
SODIUM SERPL-SCNC: 140 MMOL/L (ref 135–145)
T4 FREE SERPL-MCNC: 1.35 NG/DL (ref 0.9–1.7)
TSH SERPL DL<=0.005 MIU/L-ACNC: 5.2 UIU/ML (ref 0.3–4.2)
VIT B12 SERPL-MCNC: 484 PG/ML (ref 232–1245)
WBC # BLD AUTO: 3 10E3/UL (ref 4–11)

## 2023-10-19 PROCEDURE — 84439 ASSAY OF FREE THYROXINE: CPT | Performed by: OBSTETRICS & GYNECOLOGY

## 2023-10-19 PROCEDURE — 83735 ASSAY OF MAGNESIUM: CPT | Performed by: OBSTETRICS & GYNECOLOGY

## 2023-10-19 PROCEDURE — 82607 VITAMIN B-12: CPT | Performed by: OBSTETRICS & GYNECOLOGY

## 2023-10-19 PROCEDURE — 84443 ASSAY THYROID STIM HORMONE: CPT | Performed by: OBSTETRICS & GYNECOLOGY

## 2023-10-19 PROCEDURE — 85014 HEMATOCRIT: CPT | Performed by: OBSTETRICS & GYNECOLOGY

## 2023-10-19 PROCEDURE — 80053 COMPREHEN METABOLIC PANEL: CPT | Performed by: OBSTETRICS & GYNECOLOGY

## 2024-01-28 ENCOUNTER — HEALTH MAINTENANCE LETTER (OUTPATIENT)
Age: 54
End: 2024-01-28

## 2024-02-02 ENCOUNTER — LAB REQUISITION (OUTPATIENT)
Dept: LAB | Facility: CLINIC | Age: 54
End: 2024-02-02

## 2024-02-02 DIAGNOSIS — E03.9 HYPOTHYROIDISM, UNSPECIFIED: ICD-10-CM

## 2024-02-02 DIAGNOSIS — Z01.419 ENCOUNTER FOR GYNECOLOGICAL EXAMINATION (GENERAL) (ROUTINE) WITHOUT ABNORMAL FINDINGS: ICD-10-CM

## 2024-02-02 DIAGNOSIS — E55.9 VITAMIN D DEFICIENCY, UNSPECIFIED: ICD-10-CM

## 2024-02-02 LAB — HOLD SPECIMEN: NORMAL

## 2024-02-02 PROCEDURE — 87624 HPV HI-RISK TYP POOLED RSLT: CPT | Performed by: OBSTETRICS & GYNECOLOGY

## 2024-02-02 PROCEDURE — 82306 VITAMIN D 25 HYDROXY: CPT | Performed by: OBSTETRICS & GYNECOLOGY

## 2024-02-02 PROCEDURE — G0145 SCR C/V CYTO,THINLAYER,RESCR: HCPCS | Performed by: OBSTETRICS & GYNECOLOGY

## 2024-02-02 PROCEDURE — 84443 ASSAY THYROID STIM HORMONE: CPT | Performed by: OBSTETRICS & GYNECOLOGY

## 2024-02-03 LAB
TSH SERPL DL<=0.005 MIU/L-ACNC: 3.53 UIU/ML (ref 0.3–4.2)
VIT D+METAB SERPL-MCNC: 65 NG/ML (ref 20–50)

## 2024-02-07 LAB
BKR LAB AP GYN ADEQUACY: NORMAL
BKR LAB AP GYN INTERPRETATION: NORMAL
BKR LAB AP HPV REFLEX: NORMAL
BKR LAB AP LMP: NORMAL
BKR LAB AP PREVIOUS ABNL DX: NORMAL
BKR LAB AP PREVIOUS ABNORMAL: NORMAL
PATH REPORT.COMMENTS IMP SPEC: NORMAL
PATH REPORT.COMMENTS IMP SPEC: NORMAL
PATH REPORT.RELEVANT HX SPEC: NORMAL

## 2024-02-08 LAB
HUMAN PAPILLOMA VIRUS 16 DNA: NEGATIVE
HUMAN PAPILLOMA VIRUS 18 DNA: NEGATIVE
HUMAN PAPILLOMA VIRUS FINAL DIAGNOSIS: NORMAL
HUMAN PAPILLOMA VIRUS OTHER HR: NEGATIVE

## 2024-03-13 ENCOUNTER — HOSPITAL ENCOUNTER (OUTPATIENT)
Dept: MAMMOGRAPHY | Facility: CLINIC | Age: 54
Discharge: HOME | End: 2024-03-13
Attending: OBSTETRICS & GYNECOLOGY | Admitting: OBSTETRICS & GYNECOLOGY
Payer: COMMERCIAL

## 2024-03-13 DIAGNOSIS — Z12.31 VISIT FOR SCREENING MAMMOGRAM: ICD-10-CM

## 2024-03-13 PROCEDURE — 77063 BREAST TOMOSYNTHESIS BI: CPT

## 2025-02-02 ENCOUNTER — HEALTH MAINTENANCE LETTER (OUTPATIENT)
Age: 55
End: 2025-02-02